# Patient Record
Sex: MALE | Race: BLACK OR AFRICAN AMERICAN | NOT HISPANIC OR LATINO | URBAN - METROPOLITAN AREA
[De-identification: names, ages, dates, MRNs, and addresses within clinical notes are randomized per-mention and may not be internally consistent; named-entity substitution may affect disease eponyms.]

---

## 2022-01-17 ENCOUNTER — EMERGENCY (EMERGENCY)
Facility: HOSPITAL | Age: 32
LOS: 1 days | Discharge: HOME | End: 2022-01-17
Attending: EMERGENCY MEDICINE
Payer: MEDICAID

## 2022-01-17 VITALS
SYSTOLIC BLOOD PRESSURE: 127 MMHG | OXYGEN SATURATION: 99 % | RESPIRATION RATE: 19 BRPM | HEIGHT: 75 IN | DIASTOLIC BLOOD PRESSURE: 71 MMHG | TEMPERATURE: 98 F | WEIGHT: 315 LBS | HEART RATE: 80 BPM

## 2022-01-17 LAB
ALBUMIN SERPL ELPH-MCNC: 4.2 G/DL — SIGNIFICANT CHANGE UP (ref 3.5–5)
ALP SERPL-CCNC: 34 U/L — LOW (ref 40–120)
ALT FLD-CCNC: 64 U/L DA — HIGH (ref 10–60)
ANION GAP SERPL CALC-SCNC: 5 MMOL/L — SIGNIFICANT CHANGE UP (ref 5–17)
APPEARANCE UR: CLEAR — SIGNIFICANT CHANGE UP
AST SERPL-CCNC: 43 U/L — HIGH (ref 10–40)
BASOPHILS # BLD AUTO: 0.02 K/UL — SIGNIFICANT CHANGE UP (ref 0–0.2)
BASOPHILS NFR BLD AUTO: 0.2 % — SIGNIFICANT CHANGE UP (ref 0–2)
BILIRUB SERPL-MCNC: 0.4 MG/DL — SIGNIFICANT CHANGE UP (ref 0.2–1.2)
BILIRUB UR-MCNC: NEGATIVE — SIGNIFICANT CHANGE UP
BUN SERPL-MCNC: 13 MG/DL — SIGNIFICANT CHANGE UP (ref 7–18)
CALCIUM SERPL-MCNC: 8.7 MG/DL — SIGNIFICANT CHANGE UP (ref 8.4–10.5)
CHLORIDE SERPL-SCNC: 105 MMOL/L — SIGNIFICANT CHANGE UP (ref 96–108)
CO2 SERPL-SCNC: 28 MMOL/L — SIGNIFICANT CHANGE UP (ref 22–31)
COLOR SPEC: YELLOW — SIGNIFICANT CHANGE UP
CREAT SERPL-MCNC: 1.62 MG/DL — HIGH (ref 0.5–1.3)
DIFF PNL FLD: ABNORMAL
EOSINOPHIL # BLD AUTO: 0 K/UL — SIGNIFICANT CHANGE UP (ref 0–0.5)
EOSINOPHIL NFR BLD AUTO: 0 % — SIGNIFICANT CHANGE UP (ref 0–6)
GLUCOSE SERPL-MCNC: 133 MG/DL — HIGH (ref 70–99)
GLUCOSE UR QL: NEGATIVE — SIGNIFICANT CHANGE UP
HCT VFR BLD CALC: 43 % — SIGNIFICANT CHANGE UP (ref 39–50)
HGB BLD-MCNC: 14.3 G/DL — SIGNIFICANT CHANGE UP (ref 13–17)
IMM GRANULOCYTES NFR BLD AUTO: 0.3 % — SIGNIFICANT CHANGE UP (ref 0–1.5)
KETONES UR-MCNC: ABNORMAL
LEUKOCYTE ESTERASE UR-ACNC: NEGATIVE — SIGNIFICANT CHANGE UP
LIDOCAIN IGE QN: 92 U/L — SIGNIFICANT CHANGE UP (ref 73–393)
LYMPHOCYTES # BLD AUTO: 1.59 K/UL — SIGNIFICANT CHANGE UP (ref 1–3.3)
LYMPHOCYTES # BLD AUTO: 14 % — SIGNIFICANT CHANGE UP (ref 13–44)
MCHC RBC-ENTMCNC: 28.7 PG — SIGNIFICANT CHANGE UP (ref 27–34)
MCHC RBC-ENTMCNC: 33.3 GM/DL — SIGNIFICANT CHANGE UP (ref 32–36)
MCV RBC AUTO: 86.3 FL — SIGNIFICANT CHANGE UP (ref 80–100)
MONOCYTES # BLD AUTO: 0.65 K/UL — SIGNIFICANT CHANGE UP (ref 0–0.9)
MONOCYTES NFR BLD AUTO: 5.7 % — SIGNIFICANT CHANGE UP (ref 2–14)
NEUTROPHILS # BLD AUTO: 9.05 K/UL — HIGH (ref 1.8–7.4)
NEUTROPHILS NFR BLD AUTO: 79.8 % — HIGH (ref 43–77)
NITRITE UR-MCNC: NEGATIVE — SIGNIFICANT CHANGE UP
NRBC # BLD: 0 /100 WBCS — SIGNIFICANT CHANGE UP (ref 0–0)
PH UR: 6 — SIGNIFICANT CHANGE UP (ref 5–8)
PLATELET # BLD AUTO: 334 K/UL — SIGNIFICANT CHANGE UP (ref 150–400)
POTASSIUM SERPL-MCNC: 3.8 MMOL/L — SIGNIFICANT CHANGE UP (ref 3.5–5.3)
POTASSIUM SERPL-SCNC: 3.8 MMOL/L — SIGNIFICANT CHANGE UP (ref 3.5–5.3)
PROT SERPL-MCNC: 8 G/DL — SIGNIFICANT CHANGE UP (ref 6–8.3)
PROT UR-MCNC: 30 MG/DL
RBC # BLD: 4.98 M/UL — SIGNIFICANT CHANGE UP (ref 4.2–5.8)
RBC # FLD: 12.6 % — SIGNIFICANT CHANGE UP (ref 10.3–14.5)
SARS-COV-2 RNA SPEC QL NAA+PROBE: DETECTED
SODIUM SERPL-SCNC: 138 MMOL/L — SIGNIFICANT CHANGE UP (ref 135–145)
SP GR SPEC: 1.02 — SIGNIFICANT CHANGE UP (ref 1.01–1.02)
UROBILINOGEN FLD QL: 4
WBC # BLD: 11.34 K/UL — HIGH (ref 3.8–10.5)
WBC # FLD AUTO: 11.34 K/UL — HIGH (ref 3.8–10.5)

## 2022-01-17 PROCEDURE — 36415 COLL VENOUS BLD VENIPUNCTURE: CPT

## 2022-01-17 PROCEDURE — 96376 TX/PRO/DX INJ SAME DRUG ADON: CPT

## 2022-01-17 PROCEDURE — 83690 ASSAY OF LIPASE: CPT

## 2022-01-17 PROCEDURE — 87635 SARS-COV-2 COVID-19 AMP PRB: CPT

## 2022-01-17 PROCEDURE — 96374 THER/PROPH/DIAG INJ IV PUSH: CPT | Mod: XU

## 2022-01-17 PROCEDURE — 96375 TX/PRO/DX INJ NEW DRUG ADDON: CPT

## 2022-01-17 PROCEDURE — 99053 MED SERV 10PM-8AM 24 HR FAC: CPT

## 2022-01-17 PROCEDURE — 99285 EMERGENCY DEPT VISIT HI MDM: CPT | Mod: 25

## 2022-01-17 PROCEDURE — 76705 ECHO EXAM OF ABDOMEN: CPT | Mod: 26,RT

## 2022-01-17 PROCEDURE — 99285 EMERGENCY DEPT VISIT HI MDM: CPT

## 2022-01-17 PROCEDURE — 85025 COMPLETE CBC W/AUTO DIFF WBC: CPT

## 2022-01-17 PROCEDURE — 76705 ECHO EXAM OF ABDOMEN: CPT

## 2022-01-17 PROCEDURE — 74177 CT ABD & PELVIS W/CONTRAST: CPT | Mod: MA

## 2022-01-17 PROCEDURE — 74177 CT ABD & PELVIS W/CONTRAST: CPT | Mod: 26,MA

## 2022-01-17 PROCEDURE — 81001 URINALYSIS AUTO W/SCOPE: CPT

## 2022-01-17 PROCEDURE — 80053 COMPREHEN METABOLIC PANEL: CPT

## 2022-01-17 PROCEDURE — 87086 URINE CULTURE/COLONY COUNT: CPT

## 2022-01-17 RX ORDER — SODIUM CHLORIDE 9 MG/ML
1000 INJECTION INTRAMUSCULAR; INTRAVENOUS; SUBCUTANEOUS ONCE
Refills: 0 | Status: COMPLETED | OUTPATIENT
Start: 2022-01-17 | End: 2022-01-17

## 2022-01-17 RX ORDER — KETOROLAC TROMETHAMINE 30 MG/ML
30 SYRINGE (ML) INJECTION ONCE
Refills: 0 | Status: DISCONTINUED | OUTPATIENT
Start: 2022-01-17 | End: 2022-01-17

## 2022-01-17 RX ORDER — MORPHINE SULFATE 50 MG/1
4 CAPSULE, EXTENDED RELEASE ORAL ONCE
Refills: 0 | Status: DISCONTINUED | OUTPATIENT
Start: 2022-01-17 | End: 2022-01-17

## 2022-01-17 RX ORDER — ONDANSETRON 8 MG/1
4 TABLET, FILM COATED ORAL ONCE
Refills: 0 | Status: COMPLETED | OUTPATIENT
Start: 2022-01-17 | End: 2022-01-17

## 2022-01-17 RX ADMIN — SODIUM CHLORIDE 1000 MILLILITER(S): 9 INJECTION INTRAMUSCULAR; INTRAVENOUS; SUBCUTANEOUS at 04:13

## 2022-01-17 RX ADMIN — MORPHINE SULFATE 4 MILLIGRAM(S): 50 CAPSULE, EXTENDED RELEASE ORAL at 04:13

## 2022-01-17 RX ADMIN — MORPHINE SULFATE 4 MILLIGRAM(S): 50 CAPSULE, EXTENDED RELEASE ORAL at 07:02

## 2022-01-17 RX ADMIN — Medication 30 MILLIGRAM(S): at 10:37

## 2022-01-17 RX ADMIN — MORPHINE SULFATE 4 MILLIGRAM(S): 50 CAPSULE, EXTENDED RELEASE ORAL at 07:04

## 2022-01-17 RX ADMIN — ONDANSETRON 4 MILLIGRAM(S): 8 TABLET, FILM COATED ORAL at 04:10

## 2022-01-17 NOTE — ED PROVIDER NOTE - PROGRESS NOTE DETAILS
(Moeller) - s/o to fu US which shows gallbladder sludge   Will fu with surgery clinic  Discussed indications for patient return to ED. Patient understood.

## 2022-01-17 NOTE — ED PROVIDER NOTE - OBJECTIVE STATEMENT
32yo M with no PMHx presents with right sided abdominal pain. Reports onset of pain at 8pm yesterday. Denies fever, vomiting, diarrhea. Denies cough or recent illness. Reports he last ate at 5pm yesterday.

## 2022-01-17 NOTE — ED PROVIDER NOTE - PATIENT PORTAL LINK FT
You can access the FollowMyHealth Patient Portal offered by [facility] by registering at the following website: [email or web address]. By joining [employer]’s FollowMyHealth portal, you will also be able to view your health information using other applications (apps) compatible with our system.

## 2022-01-17 NOTE — ED PROVIDER NOTE - CLINICAL SUMMARY MEDICAL DECISION MAKING FREE TEXT BOX
32yo M with no PMHx presents with right sided abdominal pain. labs shows AST/ALT mildly elevated, Cr 1.6-given IVF, covid positive  CT A/P shows No pericholecystic inflammation. Question mild gallbladder wall thickening.Correlation with ultrasound is recommended.Mild left intrahepatic ductal dilatation. Normal caliber CBD. Findings are of uncertain etiology. Nonurgent follow-up MRI/MRCP is recommended for further evaluation.  Discussed above with patient. On reeval patient reports persistent RUQ pain. patient agrees with plan for RUQ US.  Patient signedout to Dr. Moeller at 730am.

## 2022-01-18 ENCOUNTER — INPATIENT (INPATIENT)
Facility: HOSPITAL | Age: 32
LOS: 5 days | Discharge: HOME | DRG: 417 | End: 2022-01-24
Attending: STUDENT IN AN ORGANIZED HEALTH CARE EDUCATION/TRAINING PROGRAM | Admitting: STUDENT IN AN ORGANIZED HEALTH CARE EDUCATION/TRAINING PROGRAM
Payer: MEDICAID

## 2022-01-18 VITALS
DIASTOLIC BLOOD PRESSURE: 88 MMHG | SYSTOLIC BLOOD PRESSURE: 131 MMHG | OXYGEN SATURATION: 97 % | RESPIRATION RATE: 19 BRPM | HEART RATE: 88 BPM | TEMPERATURE: 99 F

## 2022-01-18 VITALS
RESPIRATION RATE: 16 BRPM | TEMPERATURE: 99 F | HEART RATE: 83 BPM | HEIGHT: 75 IN | WEIGHT: 315 LBS | SYSTOLIC BLOOD PRESSURE: 150 MMHG | DIASTOLIC BLOOD PRESSURE: 82 MMHG | OXYGEN SATURATION: 97 %

## 2022-01-18 DIAGNOSIS — N17.9 ACUTE KIDNEY FAILURE, UNSPECIFIED: ICD-10-CM

## 2022-01-18 DIAGNOSIS — R74.01 ELEVATION OF LEVELS OF LIVER TRANSAMINASE LEVELS: ICD-10-CM

## 2022-01-18 DIAGNOSIS — Z29.9 ENCOUNTER FOR PROPHYLACTIC MEASURES, UNSPECIFIED: ICD-10-CM

## 2022-01-18 DIAGNOSIS — R10.9 UNSPECIFIED ABDOMINAL PAIN: ICD-10-CM

## 2022-01-18 DIAGNOSIS — K80.50 CALCULUS OF BILE DUCT WITHOUT CHOLANGITIS OR CHOLECYSTITIS WITHOUT OBSTRUCTION: ICD-10-CM

## 2022-01-18 DIAGNOSIS — U07.1 COVID-19: ICD-10-CM

## 2022-01-18 LAB
ALBUMIN SERPL ELPH-MCNC: 3.6 G/DL — SIGNIFICANT CHANGE UP (ref 3.5–5)
ALP SERPL-CCNC: 49 U/L — SIGNIFICANT CHANGE UP (ref 40–120)
ALT FLD-CCNC: 121 U/L DA — HIGH (ref 10–60)
ANION GAP SERPL CALC-SCNC: 7 MMOL/L — SIGNIFICANT CHANGE UP (ref 5–17)
APTT BLD: 31.9 SEC — SIGNIFICANT CHANGE UP (ref 27.5–35.5)
AST SERPL-CCNC: 74 U/L — HIGH (ref 10–40)
BASOPHILS # BLD AUTO: 0.02 K/UL — SIGNIFICANT CHANGE UP (ref 0–0.2)
BASOPHILS NFR BLD AUTO: 0.2 % — SIGNIFICANT CHANGE UP (ref 0–2)
BILIRUB SERPL-MCNC: 1.1 MG/DL — SIGNIFICANT CHANGE UP (ref 0.2–1.2)
BUN SERPL-MCNC: 6 MG/DL — LOW (ref 7–18)
CALCIUM SERPL-MCNC: 8.6 MG/DL — SIGNIFICANT CHANGE UP (ref 8.4–10.5)
CHLORIDE SERPL-SCNC: 105 MMOL/L — SIGNIFICANT CHANGE UP (ref 96–108)
CO2 SERPL-SCNC: 27 MMOL/L — SIGNIFICANT CHANGE UP (ref 22–31)
CREAT SERPL-MCNC: 1.36 MG/DL — HIGH (ref 0.5–1.3)
CRP SERPL-MCNC: 73 MG/L — HIGH
CULTURE RESULTS: SIGNIFICANT CHANGE UP
D DIMER BLD IA.RAPID-MCNC: 192 NG/ML DDU — SIGNIFICANT CHANGE UP
D DIMER BLD IA.RAPID-MCNC: 246 NG/ML DDU — HIGH
EOSINOPHIL # BLD AUTO: 0 K/UL — SIGNIFICANT CHANGE UP (ref 0–0.5)
EOSINOPHIL NFR BLD AUTO: 0 % — SIGNIFICANT CHANGE UP (ref 0–6)
FERRITIN SERPL-MCNC: 240 NG/ML — SIGNIFICANT CHANGE UP (ref 30–400)
GLUCOSE SERPL-MCNC: 114 MG/DL — HIGH (ref 70–99)
HCT VFR BLD CALC: 43.4 % — SIGNIFICANT CHANGE UP (ref 39–50)
HGB BLD-MCNC: 14 G/DL — SIGNIFICANT CHANGE UP (ref 13–17)
IMM GRANULOCYTES NFR BLD AUTO: 0.2 % — SIGNIFICANT CHANGE UP (ref 0–1.5)
INR BLD: 1.12 RATIO — SIGNIFICANT CHANGE UP (ref 0.88–1.16)
LACTATE SERPL-SCNC: 1.2 MMOL/L — SIGNIFICANT CHANGE UP (ref 0.7–2)
LDH SERPL L TO P-CCNC: 232 U/L — HIGH (ref 120–225)
LIDOCAIN IGE QN: 311 U/L — SIGNIFICANT CHANGE UP (ref 73–393)
LYMPHOCYTES # BLD AUTO: 1.33 K/UL — SIGNIFICANT CHANGE UP (ref 1–3.3)
LYMPHOCYTES # BLD AUTO: 14.1 % — SIGNIFICANT CHANGE UP (ref 13–44)
MCHC RBC-ENTMCNC: 27.9 PG — SIGNIFICANT CHANGE UP (ref 27–34)
MCHC RBC-ENTMCNC: 32.3 GM/DL — SIGNIFICANT CHANGE UP (ref 32–36)
MCV RBC AUTO: 86.5 FL — SIGNIFICANT CHANGE UP (ref 80–100)
MONOCYTES # BLD AUTO: 1.02 K/UL — HIGH (ref 0–0.9)
MONOCYTES NFR BLD AUTO: 10.8 % — SIGNIFICANT CHANGE UP (ref 2–14)
NEUTROPHILS # BLD AUTO: 7.03 K/UL — SIGNIFICANT CHANGE UP (ref 1.8–7.4)
NEUTROPHILS NFR BLD AUTO: 74.7 % — SIGNIFICANT CHANGE UP (ref 43–77)
NRBC # BLD: 0 /100 WBCS — SIGNIFICANT CHANGE UP (ref 0–0)
NT-PROBNP SERPL-SCNC: 33 PG/ML — SIGNIFICANT CHANGE UP (ref 0–125)
PLATELET # BLD AUTO: 344 K/UL — SIGNIFICANT CHANGE UP (ref 150–400)
POTASSIUM SERPL-MCNC: 3.7 MMOL/L — SIGNIFICANT CHANGE UP (ref 3.5–5.3)
POTASSIUM SERPL-SCNC: 3.7 MMOL/L — SIGNIFICANT CHANGE UP (ref 3.5–5.3)
PROCALCITONIN SERPL-MCNC: 0.11 NG/ML — HIGH (ref 0.02–0.1)
PROT SERPL-MCNC: 7.9 G/DL — SIGNIFICANT CHANGE UP (ref 6–8.3)
PROTHROM AB SERPL-ACNC: 13.3 SEC — SIGNIFICANT CHANGE UP (ref 10.6–13.6)
RBC # BLD: 5.02 M/UL — SIGNIFICANT CHANGE UP (ref 4.2–5.8)
RBC # FLD: 13 % — SIGNIFICANT CHANGE UP (ref 10.3–14.5)
SODIUM SERPL-SCNC: 139 MMOL/L — SIGNIFICANT CHANGE UP (ref 135–145)
SPECIMEN SOURCE: SIGNIFICANT CHANGE UP
TROPONIN I, HIGH SENSITIVITY RESULT: 5.8 NG/L — SIGNIFICANT CHANGE UP
WBC # BLD: 9.42 K/UL — SIGNIFICANT CHANGE UP (ref 3.8–10.5)
WBC # FLD AUTO: 9.42 K/UL — SIGNIFICANT CHANGE UP (ref 3.8–10.5)

## 2022-01-18 PROCEDURE — 99285 EMERGENCY DEPT VISIT HI MDM: CPT

## 2022-01-18 PROCEDURE — 99253 IP/OBS CNSLTJ NEW/EST LOW 45: CPT

## 2022-01-18 PROCEDURE — 99053 MED SERV 10PM-8AM 24 HR FAC: CPT

## 2022-01-18 PROCEDURE — 99223 1ST HOSP IP/OBS HIGH 75: CPT

## 2022-01-18 PROCEDURE — 93010 ELECTROCARDIOGRAM REPORT: CPT

## 2022-01-18 PROCEDURE — 71046 X-RAY EXAM CHEST 2 VIEWS: CPT | Mod: 26

## 2022-01-18 RX ORDER — SUCRALFATE 1 G
1 TABLET ORAL ONCE
Refills: 0 | Status: COMPLETED | OUTPATIENT
Start: 2022-01-18 | End: 2022-01-18

## 2022-01-18 RX ORDER — ACETAMINOPHEN 500 MG
650 TABLET ORAL EVERY 6 HOURS
Refills: 0 | Status: DISCONTINUED | OUTPATIENT
Start: 2022-01-18 | End: 2022-01-20

## 2022-01-18 RX ORDER — HYDROMORPHONE HYDROCHLORIDE 2 MG/ML
0.5 INJECTION INTRAMUSCULAR; INTRAVENOUS; SUBCUTANEOUS ONCE
Refills: 0 | Status: DISCONTINUED | OUTPATIENT
Start: 2022-01-18 | End: 2022-01-18

## 2022-01-18 RX ORDER — ENOXAPARIN SODIUM 100 MG/ML
30 INJECTION SUBCUTANEOUS DAILY
Refills: 0 | Status: DISCONTINUED | OUTPATIENT
Start: 2022-01-18 | End: 2022-01-18

## 2022-01-18 RX ORDER — SODIUM CHLORIDE 9 MG/ML
1000 INJECTION INTRAMUSCULAR; INTRAVENOUS; SUBCUTANEOUS ONCE
Refills: 0 | Status: COMPLETED | OUTPATIENT
Start: 2022-01-18 | End: 2022-01-18

## 2022-01-18 RX ORDER — MORPHINE SULFATE 50 MG/1
4 CAPSULE, EXTENDED RELEASE ORAL ONCE
Refills: 0 | Status: DISCONTINUED | OUTPATIENT
Start: 2022-01-18 | End: 2022-01-18

## 2022-01-18 RX ORDER — ENOXAPARIN SODIUM 100 MG/ML
40 INJECTION SUBCUTANEOUS DAILY
Refills: 0 | Status: DISCONTINUED | OUTPATIENT
Start: 2022-01-18 | End: 2022-01-21

## 2022-01-18 RX ORDER — HYDROMORPHONE HYDROCHLORIDE 2 MG/ML
0.5 INJECTION INTRAMUSCULAR; INTRAVENOUS; SUBCUTANEOUS EVERY 6 HOURS
Refills: 0 | Status: DISCONTINUED | OUTPATIENT
Start: 2022-01-18 | End: 2022-01-19

## 2022-01-18 RX ORDER — KETOROLAC TROMETHAMINE 30 MG/ML
30 SYRINGE (ML) INJECTION ONCE
Refills: 0 | Status: DISCONTINUED | OUTPATIENT
Start: 2022-01-18 | End: 2022-01-18

## 2022-01-18 RX ORDER — PANTOPRAZOLE SODIUM 20 MG/1
40 TABLET, DELAYED RELEASE ORAL
Refills: 0 | Status: DISCONTINUED | OUTPATIENT
Start: 2022-01-18 | End: 2022-01-24

## 2022-01-18 RX ORDER — ENOXAPARIN SODIUM 100 MG/ML
40 INJECTION SUBCUTANEOUS DAILY
Refills: 0 | Status: DISCONTINUED | OUTPATIENT
Start: 2022-01-18 | End: 2022-01-18

## 2022-01-18 RX ORDER — PANTOPRAZOLE SODIUM 20 MG/1
40 TABLET, DELAYED RELEASE ORAL ONCE
Refills: 0 | Status: COMPLETED | OUTPATIENT
Start: 2022-01-18 | End: 2022-01-18

## 2022-01-18 RX ORDER — ONDANSETRON 8 MG/1
4 TABLET, FILM COATED ORAL EVERY 6 HOURS
Refills: 0 | Status: DISCONTINUED | OUTPATIENT
Start: 2022-01-18 | End: 2022-01-24

## 2022-01-18 RX ORDER — SODIUM CHLORIDE 9 MG/ML
1000 INJECTION, SOLUTION INTRAVENOUS
Refills: 0 | Status: DISCONTINUED | OUTPATIENT
Start: 2022-01-18 | End: 2022-01-21

## 2022-01-18 RX ADMIN — HYDROMORPHONE HYDROCHLORIDE 0.5 MILLIGRAM(S): 2 INJECTION INTRAMUSCULAR; INTRAVENOUS; SUBCUTANEOUS at 12:39

## 2022-01-18 RX ADMIN — SODIUM CHLORIDE 100 MILLILITER(S): 9 INJECTION, SOLUTION INTRAVENOUS at 07:54

## 2022-01-18 RX ADMIN — SODIUM CHLORIDE 1000 MILLILITER(S): 9 INJECTION INTRAMUSCULAR; INTRAVENOUS; SUBCUTANEOUS at 03:13

## 2022-01-18 RX ADMIN — MORPHINE SULFATE 4 MILLIGRAM(S): 50 CAPSULE, EXTENDED RELEASE ORAL at 04:07

## 2022-01-18 RX ADMIN — ENOXAPARIN SODIUM 40 MILLIGRAM(S): 100 INJECTION SUBCUTANEOUS at 11:02

## 2022-01-18 RX ADMIN — HYDROMORPHONE HYDROCHLORIDE 0.5 MILLIGRAM(S): 2 INJECTION INTRAMUSCULAR; INTRAVENOUS; SUBCUTANEOUS at 04:33

## 2022-01-18 RX ADMIN — HYDROMORPHONE HYDROCHLORIDE 0.5 MILLIGRAM(S): 2 INJECTION INTRAMUSCULAR; INTRAVENOUS; SUBCUTANEOUS at 17:02

## 2022-01-18 RX ADMIN — HYDROMORPHONE HYDROCHLORIDE 0.5 MILLIGRAM(S): 2 INJECTION INTRAMUSCULAR; INTRAVENOUS; SUBCUTANEOUS at 10:59

## 2022-01-18 RX ADMIN — Medication 30 MILLIGRAM(S): at 04:15

## 2022-01-18 RX ADMIN — Medication 30 MILLILITER(S): at 03:13

## 2022-01-18 RX ADMIN — Medication 30 MILLIGRAM(S): at 05:37

## 2022-01-18 RX ADMIN — MORPHINE SULFATE 4 MILLIGRAM(S): 50 CAPSULE, EXTENDED RELEASE ORAL at 03:13

## 2022-01-18 RX ADMIN — PANTOPRAZOLE SODIUM 40 MILLIGRAM(S): 20 TABLET, DELAYED RELEASE ORAL at 03:14

## 2022-01-18 RX ADMIN — HYDROMORPHONE HYDROCHLORIDE 0.5 MILLIGRAM(S): 2 INJECTION INTRAMUSCULAR; INTRAVENOUS; SUBCUTANEOUS at 05:37

## 2022-01-18 RX ADMIN — SODIUM CHLORIDE 1000 MILLILITER(S): 9 INJECTION INTRAMUSCULAR; INTRAVENOUS; SUBCUTANEOUS at 04:15

## 2022-01-18 RX ADMIN — HYDROMORPHONE HYDROCHLORIDE 0.5 MILLIGRAM(S): 2 INJECTION INTRAMUSCULAR; INTRAVENOUS; SUBCUTANEOUS at 18:01

## 2022-01-18 RX ADMIN — Medication 1 GRAM(S): at 04:32

## 2022-01-18 RX ADMIN — PANTOPRAZOLE SODIUM 40 MILLIGRAM(S): 20 TABLET, DELAYED RELEASE ORAL at 07:50

## 2022-01-18 NOTE — H&P ADULT - PROBLEM SELECTOR PLAN 2
- Likely Pre renal for dehydration   - Will send U lytes   - IV fluids 0.9% NS; Cr improves on fluids - Likely Pre renal for dehydration   - Will send U lytes   - IV fluids LR; Cr improves on fluids

## 2022-01-18 NOTE — H&P ADULT - PROBLEM SELECTOR PLAN 1
- RUQ pain started 2 days ago   - US liver showed biliary sludge   - Will consult Dr. Angeles   - Keep him NPO   - Tylenol and Dilaudid for Pain PRN   - IV fluids 0.9% NS 100cc/hr   - f/u GI rec's - RUQ pain started 2 days ago   - US liver showed biliary sludge   - Will consult Dr. Angeles   - Keep him NPO   - Tylenol and Dilaudid for Pain PRN   - IV fluids LR  - f/u GI rec's

## 2022-01-18 NOTE — ED ADULT NURSE NOTE - OBJECTIVE STATEMENT
Pt presents to the ED with c/o worsening abdominal pain. Pt was seen yesterday for the same complaint. Denies fever, nausea, vomiting, or diarrhea.

## 2022-01-18 NOTE — H&P ADULT - NSHPSOCIALHISTORY_GEN_ALL_CORE
Self-employed in hair-extension business  Denies use of tobacco or illicit drugs  Drinks alcohol several days in the week, but denies excessive use

## 2022-01-18 NOTE — H&P ADULT - ATTENDING COMMENTS
Discussed with MAR    This is a 30 y/o male with no significant medical history presenting with RUQ pain x 2 days. Patient Reports That since January 1st, he has been on a strict juice-only diet as he is trying to pursue a healthier lifestyle. He says he drinks up to 6 juices per day from [employer] and has lost over 10 lbs. He reports that 2 days ago, his mother made him chicken and broccoli, which was the first solid food he has eaten since beginning this diet. Since then, he has had intractable RUQ pain. Reports pain seems to be moving to the left abdomen as well now. He reports vomiting on the day symptoms began, but not since then. Denies fever, chills or any other complaints. US abdomen shows biliary sludge. Patient's biliary colic likely 2/2 to reintroduction of solid food after extended period of liquid-only diet. Will admit for pain control and GI eval.    Vital Signs Last 24 Hrs  T(C): 37.2 (18 Jan 2022 02:22), Max: 37.2 (18 Jan 2022 02:22)  T(F): 98.9 (18 Jan 2022 02:22), Max: 98.9 (18 Jan 2022 02:22)  HR: 83 (18 Jan 2022 02:22) (62 - 91)  BP: 150/82 (18 Jan 2022 02:22) (145/79 - 150/82)  BP(mean): --  RR: 16 (18 Jan 2022 02:22) (16 - 18)  SpO2: 97% (18 Jan 2022 02:22) (97% - 97%)    PEx:  as above    A/P:  1. Biliary colic  - likely related to biliary sludge seen on US after reintroduction of solid food into diet  - pain control prn  - NPO for now  - IV fluids  - GI eval    2. THEA  - pt denies knowledge of kidney disease  - check urine lytes  - IV fluid challenge  - kidneys appeared normal on recent abdominal CT  - avoid nephrotoxins    3. COVID-19  - incidentally found to have COVID  - patient is unvaccinated  - asymptomatic - supportive care    4. Morbid obesity  - BMI >40  - lifestyle modification counseling provided    5. PPX  - lovenox  - PPI Discussed with MAR    This is a 30 y/o male with no significant medical history presenting with RUQ pain x 2 days. Patient Reports That since January 1st, he has been on a strict juice-only diet as he is trying to pursue a healthier lifestyle. He says he drinks up to 6 juices per day from [employer] and has lost over 10 lbs. He reports that 2 days ago, his mother made him chicken and broccoli, which was the first solid food he has eaten since beginning this diet. Since then, he has had intractable RUQ pain. Reports pain seems to be moving to the left abdomen as well now. He reports vomiting on the day symptoms began, but not since then. Denies fever, chills or any other complaints. US abdomen shows biliary sludge. Patient's biliary colic likely 2/2 to reintroduction of solid food after extended period of liquid-only diet. Will admit for pain control and GI eval.    Vital Signs Last 24 Hrs  T(C): 37.2 (18 Jan 2022 02:22), Max: 37.2 (18 Jan 2022 02:22)  T(F): 98.9 (18 Jan 2022 02:22), Max: 98.9 (18 Jan 2022 02:22)  HR: 83 (18 Jan 2022 02:22) (62 - 91)  BP: 150/82 (18 Jan 2022 02:22) (145/79 - 150/82)  BP(mean): --  RR: 16 (18 Jan 2022 02:22) (16 - 18)  SpO2: 97% (18 Jan 2022 02:22) (97% - 97%)    PEx:  as above    A/P:  1. Biliary colic  - likely related to biliary sludge seen on US after reintroduction of solid food into diet  - pain control prn  - NPO for now  - IV fluids  - GI eval    2. THEA  - pt denies knowledge of kidney disease  - check urine lytes  - IV fluid challenge  - kidneys appeared normal on recent abdominal CT  - avoid nephrotoxins    3. Transaminitis  - as above  - check hepatitis panel  - trend cmp    4. COVID-19  - incidentally found to have COVID  - patient is unvaccinated  - asymptomatic - supportive care    5. Morbid obesity  - BMI >40  - lifestyle modification counseling provided    6. PPX  - lovenox  - PPI Discussed with MAR PGY2 Dr. Ruddy Ramos.    This is a 32 y/o male with no significant medical history presenting with RUQ pain x 2 days. Patient Reports That since January 1st, he has been on a strict juice-only diet as he is trying to pursue a healthier lifestyle. He says he drinks up to 6 juices per day from [employer] and has lost over 10 lbs. He reports that 2 days ago, his mother made him chicken and broccoli, which was the first solid food he has eaten since beginning this diet. Since then, he has had intractable RUQ pain. Reports pain seems to be moving to the left abdomen as well now. He reports vomiting on the day symptoms began, but not since then. Denies fever, chills or any other complaints. US abdomen shows biliary sludge. Patient's biliary colic likely 2/2 to reintroduction of solid food after extended period of liquid-only diet. Will admit for pain control and GI eval.    Vital Signs Last 24 Hrs  T(C): 37.2 (18 Jan 2022 02:22), Max: 37.2 (18 Jan 2022 02:22)  T(F): 98.9 (18 Jan 2022 02:22), Max: 98.9 (18 Jan 2022 02:22)  HR: 83 (18 Jan 2022 02:22) (62 - 91)  BP: 150/82 (18 Jan 2022 02:22) (145/79 - 150/82)  BP(mean): --  RR: 16 (18 Jan 2022 02:22) (16 - 18)  SpO2: 97% (18 Jan 2022 02:22) (97% - 97%)    PEx:  as above    A/P:  1. Biliary colic  - likely related to biliary sludge seen on US after reintroduction of solid food into diet  - pain control prn  - NPO for now  - IV fluids  - GI eval    2. THEA  - pt denies knowledge of kidney disease  - check urine lytes  - IV fluid challenge  - kidneys appeared normal on recent abdominal CT  - avoid nephrotoxins    3. Transaminitis  - as above  - check hepatitis panel  - trend cmp    4. COVID-19  - incidentally found to have COVID  - patient is unvaccinated  - asymptomatic - supportive care    5. Morbid obesity  - BMI >40  - lifestyle modification counseling provided    6. PPX  - lovenox  - PPI

## 2022-01-18 NOTE — H&P ADULT - HISTORY OF PRESENT ILLNESS
Patient is 31 years old male with no past medical history presented to ED due to right upper quadrant pain started 2 days ago. Patient stated that since Jan 1st he started to follow strict juice diet but for last couple days he started to have solid food. His pain is RUQ, 5/10, moves sometimes to left side, non radiating anywhere. Patient also was tested positive for COVID but Asymptomatic. He is Unvaccinated. Patient doesn't take any medications at home. He denied chest pain, N/V or change in bowel movement.

## 2022-01-18 NOTE — H&P ADULT - ASSESSMENT
Patient is 31 years old male with no past medical history presented to ED due to right upper quadrant pain started 2 days ago. Patient stated that since Jan 1st he started to follow strict juice diet but for last couple days he started to have solid food. US liver showed biliary sludge but no stones. Patient will be admitted for further care needs GI consult.

## 2022-01-18 NOTE — ED ADULT TRIAGE NOTE - CHIEF COMPLAINT QUOTE
Woke up with RUQ pain radiating to the center  was seen here yesterday same problem COVID + yesterdar denies nausea no vomiting

## 2022-01-18 NOTE — ED PROVIDER NOTE - CLINICAL SUMMARY MEDICAL DECISION MAKING FREE TEXT BOX
31 y.o male presents with worsening RUQ pain that is now radiating to the LUQ. In the setting of covid infection, will obtain CXR to rule out PNA and D-dimer. Give IV fluids, Morphine, GI cocktail, and reassess. 31 y.o male presents with worsening RUQ pain that is now radiating to the LUQ. In the setting of covid infection, will obtain CXR to rule out PNA and D-dimer. Give IV fluids, Morphine, GI cocktail, and reassess.    ekg nonischemic, trop wnl, ddimer wnl, CXR shows no focal infiltrates, AST/ALT mildly increased from yesterday  discussed above with patient, after meds patient reports no improvement of pain, given sucralfate and dilaudid with improvement of pain. At this time patient agrees with plan for admission for GI evaluation.

## 2022-01-18 NOTE — H&P ADULT - NSHPREVIEWOFSYSTEMS_GEN_ALL_CORE
REVIEW OF SYSTEMS:  CONSTITUTIONAL: No weakness, fevers or chills  EYES/ENT: No visual changes;  No vertigo or throat pain   RESPIRATORY: No cough, wheezing, hemoptysis; No shortness of breath  CARDIOVASCULAR: No chest pain or palpitations  GASTROINTESTINAL: Upper quadrant abdominal pain   GENITOURINARY: No dysuria, frequency or hematuria  NEUROLOGICAL: No numbness or weakness  SKIN: No itching, rashes

## 2022-01-18 NOTE — ED PROVIDER NOTE - OBJECTIVE STATEMENT
31 y.o male with no PMHx presents with worsening abdominal pain. Patient was seen in the [facility] ED yesterday for RUQ pain; CT and ultrasound was unremarkable. At home the pain got worse and the pain is now moving from the right side to the LUQ. Patient denies of fever, vomiting, cough, or diarrhea today. In the ED, the Morphine made the pain better, but once he went home the pain worsened.

## 2022-01-19 DIAGNOSIS — Z02.9 ENCOUNTER FOR ADMINISTRATIVE EXAMINATIONS, UNSPECIFIED: ICD-10-CM

## 2022-01-19 LAB
ABO RH CONFIRMATION: SIGNIFICANT CHANGE UP
ALBUMIN SERPL ELPH-MCNC: 3.4 G/DL — LOW (ref 3.5–5)
ALP SERPL-CCNC: 80 U/L — SIGNIFICANT CHANGE UP (ref 40–120)
ALT FLD-CCNC: 201 U/L DA — HIGH (ref 10–60)
ANION GAP SERPL CALC-SCNC: 6 MMOL/L — SIGNIFICANT CHANGE UP (ref 5–17)
AST SERPL-CCNC: 138 U/L — HIGH (ref 10–40)
BASOPHILS # BLD AUTO: 0.02 K/UL — SIGNIFICANT CHANGE UP (ref 0–0.2)
BASOPHILS NFR BLD AUTO: 0.2 % — SIGNIFICANT CHANGE UP (ref 0–2)
BILIRUB SERPL-MCNC: 0.9 MG/DL — SIGNIFICANT CHANGE UP (ref 0.2–1.2)
BLD GP AB SCN SERPL QL: SIGNIFICANT CHANGE UP
BUN SERPL-MCNC: 6 MG/DL — LOW (ref 7–18)
CALCIUM SERPL-MCNC: 9.4 MG/DL — SIGNIFICANT CHANGE UP (ref 8.4–10.5)
CHLORIDE SERPL-SCNC: 105 MMOL/L — SIGNIFICANT CHANGE UP (ref 96–108)
CO2 SERPL-SCNC: 28 MMOL/L — SIGNIFICANT CHANGE UP (ref 22–31)
CREAT SERPL-MCNC: 1.39 MG/DL — HIGH (ref 0.5–1.3)
EOSINOPHIL # BLD AUTO: 0 K/UL — SIGNIFICANT CHANGE UP (ref 0–0.5)
EOSINOPHIL NFR BLD AUTO: 0 % — SIGNIFICANT CHANGE UP (ref 0–6)
GLUCOSE SERPL-MCNC: 138 MG/DL — HIGH (ref 70–99)
HCT VFR BLD CALC: 41.2 % — SIGNIFICANT CHANGE UP (ref 39–50)
HGB BLD-MCNC: 13.4 G/DL — SIGNIFICANT CHANGE UP (ref 13–17)
IMM GRANULOCYTES NFR BLD AUTO: 0.5 % — SIGNIFICANT CHANGE UP (ref 0–1.5)
LYMPHOCYTES # BLD AUTO: 1.46 K/UL — SIGNIFICANT CHANGE UP (ref 1–3.3)
LYMPHOCYTES # BLD AUTO: 13 % — SIGNIFICANT CHANGE UP (ref 13–44)
MAGNESIUM SERPL-MCNC: 2.1 MG/DL — SIGNIFICANT CHANGE UP (ref 1.6–2.6)
MCHC RBC-ENTMCNC: 28.3 PG — SIGNIFICANT CHANGE UP (ref 27–34)
MCHC RBC-ENTMCNC: 32.5 GM/DL — SIGNIFICANT CHANGE UP (ref 32–36)
MCV RBC AUTO: 87.1 FL — SIGNIFICANT CHANGE UP (ref 80–100)
MONOCYTES # BLD AUTO: 1.26 K/UL — HIGH (ref 0–0.9)
MONOCYTES NFR BLD AUTO: 11.2 % — SIGNIFICANT CHANGE UP (ref 2–14)
NEUTROPHILS # BLD AUTO: 8.45 K/UL — HIGH (ref 1.8–7.4)
NEUTROPHILS NFR BLD AUTO: 75.1 % — SIGNIFICANT CHANGE UP (ref 43–77)
NRBC # BLD: 0 /100 WBCS — SIGNIFICANT CHANGE UP (ref 0–0)
PHOSPHATE SERPL-MCNC: 2.1 MG/DL — LOW (ref 2.5–4.5)
PLATELET # BLD AUTO: 296 K/UL — SIGNIFICANT CHANGE UP (ref 150–400)
POTASSIUM SERPL-MCNC: 3.8 MMOL/L — SIGNIFICANT CHANGE UP (ref 3.5–5.3)
POTASSIUM SERPL-SCNC: 3.8 MMOL/L — SIGNIFICANT CHANGE UP (ref 3.5–5.3)
PROT SERPL-MCNC: 7.5 G/DL — SIGNIFICANT CHANGE UP (ref 6–8.3)
RBC # BLD: 4.73 M/UL — SIGNIFICANT CHANGE UP (ref 4.2–5.8)
RBC # FLD: 13.4 % — SIGNIFICANT CHANGE UP (ref 10.3–14.5)
SODIUM SERPL-SCNC: 139 MMOL/L — SIGNIFICANT CHANGE UP (ref 135–145)
WBC # BLD: 11.25 K/UL — HIGH (ref 3.8–10.5)
WBC # FLD AUTO: 11.25 K/UL — HIGH (ref 3.8–10.5)

## 2022-01-19 PROCEDURE — 99233 SBSQ HOSP IP/OBS HIGH 50: CPT

## 2022-01-19 PROCEDURE — 99232 SBSQ HOSP IP/OBS MODERATE 35: CPT

## 2022-01-19 RX ADMIN — HYDROMORPHONE HYDROCHLORIDE 0.5 MILLIGRAM(S): 2 INJECTION INTRAMUSCULAR; INTRAVENOUS; SUBCUTANEOUS at 16:53

## 2022-01-19 RX ADMIN — SODIUM CHLORIDE 100 MILLILITER(S): 9 INJECTION, SOLUTION INTRAVENOUS at 03:40

## 2022-01-19 RX ADMIN — PANTOPRAZOLE SODIUM 40 MILLIGRAM(S): 20 TABLET, DELAYED RELEASE ORAL at 06:12

## 2022-01-19 RX ADMIN — HYDROMORPHONE HYDROCHLORIDE 0.5 MILLIGRAM(S): 2 INJECTION INTRAMUSCULAR; INTRAVENOUS; SUBCUTANEOUS at 08:31

## 2022-01-19 RX ADMIN — SODIUM CHLORIDE 100 MILLILITER(S): 9 INJECTION, SOLUTION INTRAVENOUS at 05:53

## 2022-01-19 RX ADMIN — HYDROMORPHONE HYDROCHLORIDE 0.5 MILLIGRAM(S): 2 INJECTION INTRAMUSCULAR; INTRAVENOUS; SUBCUTANEOUS at 08:01

## 2022-01-19 RX ADMIN — HYDROMORPHONE HYDROCHLORIDE 0.5 MILLIGRAM(S): 2 INJECTION INTRAMUSCULAR; INTRAVENOUS; SUBCUTANEOUS at 03:40

## 2022-01-19 RX ADMIN — HYDROMORPHONE HYDROCHLORIDE 0.5 MILLIGRAM(S): 2 INJECTION INTRAMUSCULAR; INTRAVENOUS; SUBCUTANEOUS at 17:15

## 2022-01-19 RX ADMIN — HYDROMORPHONE HYDROCHLORIDE 0.5 MILLIGRAM(S): 2 INJECTION INTRAMUSCULAR; INTRAVENOUS; SUBCUTANEOUS at 00:09

## 2022-01-19 NOTE — PATIENT PROFILE ADULT - FUNCTIONAL ASSESSMENT - BASIC MOBILITY 1.
TRANSFER - OUT REPORT:     Verbal report given to: RN. Report consisted of patient's Situation, Background, Assessment and   Recommendations(SBAR). Opportunity for questions and clarification was provided. Patient transported with a Registered Nurse. Patient transported to: recovery. 4 = No assist / stand by assistance

## 2022-01-19 NOTE — PROGRESS NOTE ADULT - SUBJECTIVE AND OBJECTIVE BOX
Patient is a 31y old  Male who presents with a chief complaint of RUQ pain (19 Jan 2022 10:37)    INTERVAL HPI/OVERNIGHT EVENTS: nO acute event onvernight    REVIEW OF SYSTEMS:  CONSTITUTIONAL: No fever, chills  ENMT:  No difficulty hearing, no change in vision  NECK: No pain or stiffness  RESPIRATORY: No cough, SOB  CARDIOVASCULAR: No chest pain, palpitations  GASTROINTESTINAL: No abdominal pain. No nausea, vomiting, or diarrhea  GENITOURINARY: No dysuria  NEUROLOGICAL: No HA  SKIN: No itching, burning, rashes, or lesions   LYMPH NODES: No enlarged glands  ENDOCRINE: No heat or cold intolerance; No hair loss  MUSCULOSKELETAL: No joint pain or swelling; No muscle, back, or extremity pain  PSYCHIATRIC: No depression, anxiety  HEME/LYMPH: No easy bruising, or bleeding gums    T(C): 36.9 (01-19-22 @ 11:20), Max: 37.3 (01-19-22 @ 00:17)  HR: 105 (01-19-22 @ 11:20) (88 - 110)  BP: 143/85 (01-19-22 @ 11:20) (122/84 - 151/69)  RR: 18 (01-19-22 @ 11:20) (18 - 20)  SpO2: 97% (01-19-22 @ 11:20) (97% - 99%)  Wt(kg): --Vital Signs Last 24 Hrs  T(C): 36.9 (19 Jan 2022 11:20), Max: 37.3 (19 Jan 2022 00:17)  T(F): 98.4 (19 Jan 2022 11:20), Max: 99.2 (19 Jan 2022 00:17)  HR: 105 (19 Jan 2022 11:20) (88 - 110)  BP: 143/85 (19 Jan 2022 11:20) (122/84 - 151/69)  BP(mean): --  RR: 18 (19 Jan 2022 11:20) (18 - 20)  SpO2: 97% (19 Jan 2022 11:20) (97% - 99%)    MEDICATIONS  (STANDING):  enoxaparin Injectable 40 milliGRAM(s) SubCutaneous daily  lactated ringers. 1000 milliLiter(s) (100 mL/Hr) IV Continuous <Continuous>  pantoprazole    Tablet 40 milliGRAM(s) Oral before breakfast    MEDICATIONS  (PRN):  acetaminophen     Tablet .. 650 milliGRAM(s) Oral every 6 hours PRN Mild Pain (1 - 3)  HYDROmorphone  Injectable 0.5 milliGRAM(s) IV Push every 6 hours PRN Severe Pain (7 - 10)  ondansetron Injectable 4 milliGRAM(s) IV Push every 6 hours PRN Nausea and/or Vomiting      PHYSICAL EXAM:  GENERAL: NAD  EYES: clear conjunctiva; EOMI  ENMT: Moist mucous membranes  NECK: Supple, No JVD, Normal thyroid  CHEST/LUNG: Clear to auscultation bilaterally; No rales, rhonchi, wheezing, or rubs  HEART: S1, S2, Regular rate and rhythm  ABDOMEN: Soft, Nontender, Nondistended; Bowel sounds present  NEURO: Alert & Oriented X3  EXTREMITIES: No LE edema, no calf tenderness  LYMPH: No lymphadenopathy noted  SKIN: No rashes or lesions    Consultant(s) Notes Reviewed:  [x ] YES  [ ] NO  Care Discussed with Consultants/Other Providers [ x] YES  [ ] NO    LABS:                        13.4   11.25 )-----------( 296      ( 19 Jan 2022 06:53 )             41.2     01-19    139  |  105  |  6<L>  ----------------------------<  138<H>  3.8   |  28  |  1.39<H>    Ca    9.4      19 Jan 2022 06:53  Phos  2.1     01-19  Mg     2.1     01-19    TPro  7.5  /  Alb  3.4<L>  /  TBili  0.9  /  DBili  x   /  AST  138<H>  /  ALT  201<H>  /  AlkPhos  80  01-19    PT/INR - ( 18 Jan 2022 03:13 )   PT: 13.3 sec;   INR: 1.12 ratio         PTT - ( 18 Jan 2022 03:13 )  PTT:31.9 sec  CAPILLARY BLOOD GLUCOSE      RADIOLOGY & ADDITIONAL TESTS:    Imaging Personally Reviewed:  [ x] YES  [ ] NO  < from: Xray Chest 2 Views PA/Lat (01.18.22 @ 02:51) >  ACC: [id number] EXAM:  XR CHEST PA LAT 2V                          PROCEDURE DATE:  01/18/2022          INTERPRETATION:  PA and lateral chest radiographs    COMPARISON: 1/18/2020 chest.    CLINICAL INFORMATION: Assess for pneumonia.    FINDINGS:    PULMONARY: The airway is midline.  There are no airspace consolidations.  No pleural effusion or pneumothorax.    HEART/VASCULAR: The heart size and mediastinum configuration are within   the limits of normal.    BONES: The visualized osseus structuresare intact.    IMPRESSION:    No acute radiographic cardiopulmonary pathology.    --- End of Report ---              < end of copied text >  < from: US Abdomen Upper Quadrant Right (01.17.22 @ 09:07) >  ACC: [id number] EXAM:  US ABDOMEN RT UPR QUADRANT                          PROCEDURE DATE:  01/17/2022          INTERPRETATION:  CLINICAL INFORMATION: Abdominal pain    COMPARISON: None available.    TECHNIQUE: Sonography of the right upper quadrant.    FINDINGS:    Liver: Within normal limits.  Bile ducts: Normal caliber. Common bile duct measures 7 mm.  Gallbladder: Gallbladder sludge without gallstones  Pancreas: Partially obscured  Right kidney: 11.0 cm. No hydronephrosis.  Ascites: None.  IVC: Visualized portions are within normal limits.    IMPRESSION:    Gallbladder sludge without gallstones.        --- End of Report ---            KHARI LOPEZ MD; Attending Radiologist  This document has been electronically signed. Jan 17 2022  9:38AM    < end of copied text >

## 2022-01-19 NOTE — PROGRESS NOTE ADULT - ASSESSMENT
30 y/o Male w/ symptomatic cholelithiasis, COVID positive     -NPO  -IVF   -IV abx   -Pain medication PRN   -Supportive care for COVID infection   -Pt wants surgical intervention, will f/u w/ Dr Hank nguyen

## 2022-01-19 NOTE — PROGRESS NOTE ADULT - ASSESSMENT
Patient is 31 years old male with no past medical history presented to ED due to right upper quadrant pain started 2 days ago. Patient stated that since Jan 1st he started to follow strict juice diet but for last couple days he started to have solid food. US liver showed biliary sludge but no stones. Plan for Cholecystectomy No GI intervention at this time. Surgery following. incidentally found to have COvid.. CXr unremarkable.

## 2022-01-19 NOTE — PATIENT PROFILE ADULT - FALL HARM RISK - UNIVERSAL INTERVENTIONS
Bed in lowest position, wheels locked, appropriate side rails in place/Call bell, personal items and telephone in reach/Instruct patient to call for assistance before getting out of bed or chair/Non-slip footwear when patient is out of bed/[place] to call system/Physically safe environment - no spills, clutter or unnecessary equipment/Purposeful Proactive Rounding/Room/bathroom lighting operational, light cord in reach

## 2022-01-19 NOTE — PROGRESS NOTE ADULT - SUBJECTIVE AND OBJECTIVE BOX
INTERVAL HPI/OVERNIGHT EVENTS:    Pt seen and examined at bedside. Pt admits to abdominal pain overnight, currently improved after receiving pain medication. Offers no other complaints.   Pt is NPO    Vital Signs Last 24 Hrs  T(C): 37.2 (19 Jan 2022 07:30), Max: 37.3 (19 Jan 2022 00:17)  T(F): 98.9 (19 Jan 2022 07:30), Max: 99.2 (19 Jan 2022 00:17)  HR: 108 (19 Jan 2022 07:30) (88 - 110)  BP: 151/69 (19 Jan 2022 07:30) (122/84 - 151/69)  BP(mean): --  RR: 18 (19 Jan 2022 07:30) (18 - 20)  SpO2: 97% (19 Jan 2022 07:30) (97% - 99%)  I&O's Detail    pantoprazole    Tablet 40 milliGRAM(s) Oral before breakfast      Physical Exam  General: AAOx3, No acute distress  Skin: No jaundice, no icterus  Abdomen: soft, nondistended, RUQ TTP, no rebound tenderness, no guarding, no palpable masses  Extremities: non edematous, no calf pain bilaterally        Labs:                        13.4   11.25 )-----------( 296      ( 19 Jan 2022 06:53 )             41.2     01-19    139  |  105  |  6<L>  ----------------------------<  138<H>  3.8   |  28  |  1.39<H>    Ca    9.4      19 Jan 2022 06:53  Phos  2.1     01-19  Mg     2.1     01-19    TPro  7.5  /  Alb  3.4<L>  /  TBili  0.9  /  DBili  x   /  AST  138<H>  /  ALT  201<H>  /  AlkPhos  80  01-19    PT/INR - ( 18 Jan 2022 03:13 )   PT: 13.3 sec;   INR: 1.12 ratio         PTT - ( 18 Jan 2022 03:13 )  PTT:31.9 sec

## 2022-01-19 NOTE — ED ADULT NURSE REASSESSMENT NOTE - NS ED NURSE REASSESS COMMENT FT1
Pt. is stable at this time. No complaints of pain voiced at this time. Pt. NPO since 8 am and is aware[place].
Received patient alert and oriented x 3 in no acute distress from main ED. Patient presents to ed for lower abdominal pain 5/10  with cramping. Nausea denied at this time . Dilaudid given with relief in pain. 20 gauge saline lock noted to left ac patent and no alterations noted to site. IVF restarted and maintained. Patient ambulatory to bathroom with no difficulty. Clear diet maintained and tolerated well. No further distress noted at this time. Will continue to follow up with plan of care.

## 2022-01-19 NOTE — PROGRESS NOTE ADULT - PROBLEM SELECTOR PLAN 1
- P/w  RUQ pain started 2 days ago   - US liver showed biliary sludge   - NPO for choloe  - C/w Tylenol and Dilaudid for Pain PRN   - C/w IV fluids  - No intervention for GI  - surgery following

## 2022-01-20 LAB
ALBUMIN SERPL ELPH-MCNC: 3.2 G/DL — LOW (ref 3.5–5)
ALP SERPL-CCNC: 124 U/L — HIGH (ref 40–120)
ALT FLD-CCNC: 188 U/L DA — HIGH (ref 10–60)
ANION GAP SERPL CALC-SCNC: 5 MMOL/L — SIGNIFICANT CHANGE UP (ref 5–17)
AST SERPL-CCNC: 160 U/L — HIGH (ref 10–40)
BILIRUB SERPL-MCNC: 1.1 MG/DL — SIGNIFICANT CHANGE UP (ref 0.2–1.2)
BUN SERPL-MCNC: 6 MG/DL — LOW (ref 7–18)
CALCIUM SERPL-MCNC: 9 MG/DL — SIGNIFICANT CHANGE UP (ref 8.4–10.5)
CHLORIDE SERPL-SCNC: 105 MMOL/L — SIGNIFICANT CHANGE UP (ref 96–108)
CO2 SERPL-SCNC: 28 MMOL/L — SIGNIFICANT CHANGE UP (ref 22–31)
CREAT SERPL-MCNC: 1.42 MG/DL — HIGH (ref 0.5–1.3)
GLUCOSE SERPL-MCNC: 109 MG/DL — HIGH (ref 70–99)
HCT VFR BLD CALC: 39.3 % — SIGNIFICANT CHANGE UP (ref 39–50)
HGB BLD-MCNC: 13.1 G/DL — SIGNIFICANT CHANGE UP (ref 13–17)
MCHC RBC-ENTMCNC: 28.3 PG — SIGNIFICANT CHANGE UP (ref 27–34)
MCHC RBC-ENTMCNC: 33.3 GM/DL — SIGNIFICANT CHANGE UP (ref 32–36)
MCV RBC AUTO: 84.9 FL — SIGNIFICANT CHANGE UP (ref 80–100)
NRBC # BLD: 0 /100 WBCS — SIGNIFICANT CHANGE UP (ref 0–0)
PLATELET # BLD AUTO: 328 K/UL — SIGNIFICANT CHANGE UP (ref 150–400)
POTASSIUM SERPL-MCNC: 3.9 MMOL/L — SIGNIFICANT CHANGE UP (ref 3.5–5.3)
POTASSIUM SERPL-SCNC: 3.9 MMOL/L — SIGNIFICANT CHANGE UP (ref 3.5–5.3)
PROT SERPL-MCNC: 7.7 G/DL — SIGNIFICANT CHANGE UP (ref 6–8.3)
RBC # BLD: 4.63 M/UL — SIGNIFICANT CHANGE UP (ref 4.2–5.8)
RBC # FLD: 13.3 % — SIGNIFICANT CHANGE UP (ref 10.3–14.5)
SODIUM SERPL-SCNC: 138 MMOL/L — SIGNIFICANT CHANGE UP (ref 135–145)
WBC # BLD: 10.07 K/UL — SIGNIFICANT CHANGE UP (ref 3.8–10.5)
WBC # FLD AUTO: 10.07 K/UL — SIGNIFICANT CHANGE UP (ref 3.8–10.5)

## 2022-01-20 PROCEDURE — 99232 SBSQ HOSP IP/OBS MODERATE 35: CPT | Mod: 57

## 2022-01-20 PROCEDURE — 99233 SBSQ HOSP IP/OBS HIGH 50: CPT

## 2022-01-20 RX ORDER — CHLORHEXIDINE GLUCONATE 213 G/1000ML
1 SOLUTION TOPICAL
Refills: 0 | Status: DISCONTINUED | OUTPATIENT
Start: 2022-01-20 | End: 2022-01-24

## 2022-01-20 RX ORDER — KETOROLAC TROMETHAMINE 30 MG/ML
30 SYRINGE (ML) INJECTION EVERY 6 HOURS
Refills: 0 | Status: DISCONTINUED | OUTPATIENT
Start: 2022-01-20 | End: 2022-01-21

## 2022-01-20 RX ORDER — ACETAMINOPHEN 500 MG
1000 TABLET ORAL ONCE
Refills: 0 | Status: COMPLETED | OUTPATIENT
Start: 2022-01-20 | End: 2022-01-20

## 2022-01-20 RX ORDER — HYDROMORPHONE HYDROCHLORIDE 2 MG/ML
1 INJECTION INTRAMUSCULAR; INTRAVENOUS; SUBCUTANEOUS EVERY 6 HOURS
Refills: 0 | Status: DISCONTINUED | OUTPATIENT
Start: 2022-01-20 | End: 2022-01-21

## 2022-01-20 RX ORDER — ACETAMINOPHEN 500 MG
650 TABLET ORAL EVERY 4 HOURS
Refills: 0 | Status: DISCONTINUED | OUTPATIENT
Start: 2022-01-20 | End: 2022-01-22

## 2022-01-20 RX ORDER — OXYCODONE HYDROCHLORIDE 5 MG/1
10 TABLET ORAL EVERY 6 HOURS
Refills: 0 | Status: DISCONTINUED | OUTPATIENT
Start: 2022-01-20 | End: 2022-01-20

## 2022-01-20 RX ORDER — HYDROMORPHONE HYDROCHLORIDE 2 MG/ML
0.5 INJECTION INTRAMUSCULAR; INTRAVENOUS; SUBCUTANEOUS ONCE
Refills: 0 | Status: DISCONTINUED | OUTPATIENT
Start: 2022-01-20 | End: 2022-01-20

## 2022-01-20 RX ORDER — SODIUM CHLORIDE 9 MG/ML
1000 INJECTION, SOLUTION INTRAVENOUS
Refills: 0 | Status: DISCONTINUED | OUTPATIENT
Start: 2022-01-20 | End: 2022-01-21

## 2022-01-20 RX ORDER — OXYCODONE HYDROCHLORIDE 5 MG/1
5 TABLET ORAL EVERY 6 HOURS
Refills: 0 | Status: DISCONTINUED | OUTPATIENT
Start: 2022-01-20 | End: 2022-01-20

## 2022-01-20 RX ADMIN — HYDROMORPHONE HYDROCHLORIDE 0.5 MILLIGRAM(S): 2 INJECTION INTRAMUSCULAR; INTRAVENOUS; SUBCUTANEOUS at 02:35

## 2022-01-20 RX ADMIN — SODIUM CHLORIDE 100 MILLILITER(S): 9 INJECTION, SOLUTION INTRAVENOUS at 13:35

## 2022-01-20 RX ADMIN — Medication 400 MILLIGRAM(S): at 13:35

## 2022-01-20 RX ADMIN — HYDROMORPHONE HYDROCHLORIDE 0.5 MILLIGRAM(S): 2 INJECTION INTRAMUSCULAR; INTRAVENOUS; SUBCUTANEOUS at 05:34

## 2022-01-20 RX ADMIN — CHLORHEXIDINE GLUCONATE 1 APPLICATION(S): 213 SOLUTION TOPICAL at 19:25

## 2022-01-20 RX ADMIN — OXYCODONE HYDROCHLORIDE 10 MILLIGRAM(S): 5 TABLET ORAL at 22:00

## 2022-01-20 RX ADMIN — Medication 1000 MILLIGRAM(S): at 14:00

## 2022-01-20 RX ADMIN — PANTOPRAZOLE SODIUM 40 MILLIGRAM(S): 20 TABLET, DELAYED RELEASE ORAL at 06:28

## 2022-01-20 RX ADMIN — OXYCODONE HYDROCHLORIDE 10 MILLIGRAM(S): 5 TABLET ORAL at 21:08

## 2022-01-20 NOTE — CHART NOTE - NSCHARTNOTEFT_GEN_A_CORE
EVENT: Pain 10/10 refusing oxycodone    BRIEF HPI: 31 years old male with no past medical history presented to ED due to right upper quadrant pain started 2 days ago. Patient stated that since Jan 1st he started to follow strict juice diet but for last couple days he started to have solid food. His pain is RUQ, 5/10, moves sometimes to left side, non radiating anywhere. Patient also was tested positive for COVID but Asymptomatic. He is Unvaccinated. Patient doesn't take any medications at home.     OBJECTIVE:  Vital Signs Last 24 Hrs  T(C): 37.3 (20 Jan 2022 20:43), Max: 37.6 (20 Jan 2022 12:54)  T(F): 99.1 (20 Jan 2022 20:43), Max: 99.6 (20 Jan 2022 12:54)  HR: 95 (20 Jan 2022 20:43) (82 - 97)  BP: 129/84 (20 Jan 2022 20:43) (129/84 - 135/88)  BP(mean): --  RR: 18 (20 Jan 2022 20:43) (17 - 18)  SpO2: 100% (20 Jan 2022 20:43) (99% - 100%)    FOCUSED PHYSICAL EXAM:    LABS:                        13.1   10.07 )-----------( 328      ( 20 Jan 2022 07:07 )             39.3     01-20    138  |  105  |  6<L>  ----------------------------<  109<H>  3.9   |  28  |  1.42<H>    Ca    9.0      20 Jan 2022 07:07  Phos  2.1     01-19  Mg     2.1     01-19    TPro  7.7  /  Alb  3.2<L>  /  TBili  1.1  /  DBili  x   /  AST  160<H>  /  ALT  188<H>  /  AlkPhos  124<H>  01-20      EKG:   IMGAGING:    ASSESSMENT:  HPI:  Patient is 31 years old male with no past medical history presented to ED due to right upper quadrant pain started 2 days ago. Patient stated that since Jan 1st he started to follow strict juice diet but for last couple days he started to have solid food. His pain is RUQ, 5/10, moves sometimes to left side, non radiating anywhere. Patient also was tested positive for COVID but Asymptomatic. He is Unvaccinated. Patient doesn't take any medications at home. He denied chest pain, N/V or change in bowel movement.  (18 Jan 2022 06:41)      PLAN:   MEDICATIONS  (STANDING):  chlorhexidine 2% Cloths 1 Application(s) Topical two times a day  enoxaparin Injectable 40 milliGRAM(s) SubCutaneous daily  lactated ringers. 1000 milliLiter(s) (100 mL/Hr) IV Continuous <Continuous>  lactated ringers. 1000 milliLiter(s) (100 mL/Hr) IV Continuous <Continuous>  pantoprazole    Tablet 40 milliGRAM(s) Oral before breakfast    MEDICATIONS  (PRN):  acetaminophen     Tablet .. 650 milliGRAM(s) Oral every 4 hours PRN Mild Pain (1 - 3)  HYDROmorphone  Injectable 1 milliGRAM(s) IV Push every 6 hours PRN Severe Pain (7 - 10)  ketorolac   Injectable 30 milliGRAM(s) IV Push every 6 hours PRN Moderate Pain (4 - 6)  ondansetron Injectable 4 milliGRAM(s) IV Push every 6 hours PRN Nausea and/or Vomiting    FOLLOW UP / RESULT: EVENT: Pain 10/10 refusing oxycodone    BRIEF HPI: 31 years old male with no past medical history presented to ED due to right upper quadrant pain started 2 days ago. Patient stated that since Jan 1st he started to follow strict juice diet but for last couple days he started to have solid food. His pain is RUQ, 5/10, moves sometimes to left side, non radiating anywhere. Patient also was tested positive for COVID but Asymptomatic. He is Unvaccinated. Patient doesn't take any medications at home.     OBJECTIVE:  Vital Signs Last 24 Hrs  T(C): 37.3 (20 Jan 2022 20:43), Max: 37.6 (20 Jan 2022 12:54)  T(F): 99.1 (20 Jan 2022 20:43), Max: 99.6 (20 Jan 2022 12:54)  HR: 95 (20 Jan 2022 20:43) (82 - 97)  BP: 129/84 (20 Jan 2022 20:43) (129/84 - 135/88)  BP(mean): --  RR: 18 (20 Jan 2022 20:43) (17 - 18)  SpO2: 100% (20 Jan 2022 20:43) (99% - 100%)    FOCUSED PHYSICAL EXAM: see in pt PE        LABS:                        13.1   10.07 )-----------( 328      ( 20 Jan 2022 07:07 )             39.3     01-20    138  |  105  |  6<L>  ----------------------------<  109<H>  3.9   |  28  |  1.42<H>    Ca    9.0      20 Jan 2022 07:07  Phos  2.1     01-19  Mg     2.1     01-19    TPro  7.7  /  Alb  3.2<L>  /  TBili  1.1  /  DBili  x   /  AST  160<H>  /  ALT  188<H>  /  AlkPhos  124<H>  01-20    IMAGING:  ACC: [id number] EXAM:  CT ABDOMEN AND PELVIS IC                        PROCEDURE DATE:  01/17/2022    IMPRESSION:  No pericholecystic inflammation. Question mild gallbladder wall   thickening.  Correlation with ultrasound is recommended.  Mild left intrahepatic ductal dilatation. Normal caliber CBD. Findings   are of uncertain etiology. Nonurgent follow-up MRI/MRCP is recommended   for further evaluation.    PROBLEM: Pain due to biliary colic   PLAN:   1. Acetaminophen  Tablet 650 mana GRAM(s) Oral every 4 hours PRN Mild Pain (1 - 3)  2. Hydromorphone  Injectable 1 mana GRAM(s) IV Push every 6 hours PRN Severe Pain (7 - 10)  3. ketorolac   Injectable 30 mana GRAM(s) IV Push every 6 hours PRN Moderate Pain (4 - 6)  4. Laparoscopic cholecystectomy 1/21    FOLLOW UP / RESULT: effectiveness of medication

## 2022-01-20 NOTE — PROGRESS NOTE ADULT - NSPROGADDITIONALINFOA_GEN_ALL_CORE
Patient admitted with sludge and acute cholecystitis. Radiology studies reviewed. Tender RUQ abdomen. Laparoscopic cholecystectomy possible open was discussed. The potential for bleeding, CBD injury, bowel injury and other related complications were discussed. All the questions were answered.  He is covid positive and would need a room and availability for the surgery  Discussed with him, if available will go ahead withe surgery  He had refused surgery yesterday and so was not scheduled for surgery
Patient admitted with sludge and acute cholecystitis. Radiology studies reviewed. Tender RUQ abdomen. Laparoscopic cholecystectomy possible open was discussed. The potential for bleeding, CBD injury, bowel injury and other related complications were discussed. All the questions were answered.  Informed consent for laparoscopic cholecystectomy possible open surgery was obtained

## 2022-01-20 NOTE — PROGRESS NOTE ADULT - ASSESSMENT
31 year old male with acute cholecystitis and COVID19    - plan for laparoscopic cholecystectomy in AM   - NPO after midnight  - please check preop labs  - chlorhexidine wash   - fluoroscopy to be ordered  - medical clearance/optimization appreciated 31 year old male with symptomatic cholelithiasis vs. acute cholecystitis and COVID19    - plan for laparoscopic cholecystectomy in AM   - NPO after midnight  - please check preop labs  - chlorhexidine wash   - fluoroscopy to be ordered  - medical clearance/optimization appreciated

## 2022-01-20 NOTE — PROGRESS NOTE ADULT - SUBJECTIVE AND OBJECTIVE BOX
Chart and meds reviewed.  Patient seen and examined; d/w IDR team, consultants,  pt, and primary RN    HPI: Patient is 31 years old male with no past medical history presented to ED due to right upper quadrant pain started 2 days ago. Patient stated that since Jan 1st he started to follow strict juice diet but for last couple days he started to have solid food. His pain is RUQ, 5/10, moves sometimes to left side, non radiating anywhere. Patient also was tested positive for COVID but Asymptomatic. He is Unvaccinated. Patient doesn't take any medications at home. He denied chest pain, N/V or change in bowel movement.  (18 Jan 2022 06:41)    Interval Hx: still having significant abdominal pain minimal relief with oral tylenol; he gets pain at rest and worst with movement, tolerated apple juice today, denies N/V    MEDICATIONS  (STANDING):  enoxaparin Injectable 40 milliGRAM(s) SubCutaneous daily  lactated ringers. 1000 milliLiter(s) (100 mL/Hr) IV Continuous <Continuous>  lactated ringers. 1000 milliLiter(s) (100 mL/Hr) IV Continuous <Continuous>  pantoprazole    Tablet 40 milliGRAM(s) Oral before breakfast    MEDICATIONS  (PRN):  ondansetron Injectable 4 milliGRAM(s) IV Push every 6 hours PRN Nausea and/or Vomiting      VITALS:  Vital Signs Last 24 Hrs  T(C): 37.6 (20 Jan 2022 12:54), Max: 37.6 (20 Jan 2022 12:54)  T(F): 99.6 (20 Jan 2022 12:54), Max: 99.6 (20 Jan 2022 12:54)  HR: 97 (20 Jan 2022 12:54) (82 - 106)  BP: 132/78 (20 Jan 2022 12:54) (120/70 - 135/88)  BP(mean): --  RR: 17 (20 Jan 2022 12:54) (17 - 18)  SpO2: 99% (20 Jan 2022 12:54) (98% - 99%)      PHYSICAL EXAM:    General: obese, NAD    HEENT:  pupils equal and reactive, EOMI, no oropharyngeal lesions, erythema, exudates, oral thrush    NECK:   supple, no carotid bruits, no palpable lymph nodes, no thyromegaly    CV:  +S1, +S2, regular, no murmurs or rubs    RESP:   lungs clear to auscultation bilaterally, no wheezing, rales, rhonchi, good air entry bilaterally    BREAST:  not examined    GI:  abdomen soft, +tenderness >RUQ, non-distended, no bruits, no palpable masses    :  voiding well spontaneously    MSK:   normal muscle tone, no atrophy, no rigidity, no contractions    EXT:   no clubbing, no cyanosis, no edema, no calf pain, swelling or erythema    VASCULAR:  pulses equal and symmetric in the upper and lower extremities    NEURO:  AAOX3, no focal neurological deficits, follows all commands, able to move extremities spontaneously    SKIN:  no ulcers, lesions or rashes      LABS:                        13.1   10.07 )-----------( 328      ( 20 Jan 2022 07:07 )             39.3     01-20    138  |  105  |  6<L>  ----------------------------<  109<H>  3.9   |  28  |  1.42<H>    Ca    9.0      20 Jan 2022 07:07  Phos  2.1     01-19  Mg     2.1     01-19    TPro  7.7  /  Alb  3.2<L>  /  TBili  1.1  /  DBili  x   /  AST  160<H>  /  ALT  188<H>  /  AlkPhos  124<H>  01-20    LIVER FUNCTIONS - ( 20 Jan 2022 07:07 )  Alb: 3.2 g/dL / Pro: 7.7 g/dL / ALK PHOS: 124 U/L / ALT: 188 U/L DA / AST: 160 U/L / GGT: x           Serum Pro-Brain Natriuretic Peptide: 33 pg/mL (01-18-22 @ 03:13)    PERTINENT DIAGNOSTICS  < from: CT Abdomen and Pelvis w/ IV Cont (01.17.22 @ 05:28) >  IMPRESSION:  No pericholecystic inflammation. Question mild gallbladder wall   thickening.  Correlation with ultrasound is recommended.    Mild left intrahepatic ductal dilatation. Normal caliber CBD. Findings   are of uncertain etiology. Nonurgent follow-up MRI/MRCP is recommended   for further evaluation.    < end of copied text >      A/P  31 years old Man no known medical hx who on 1/18 presented from home to ED for 2 day hx of RUQ pain; US liver showed biliary sludge but no stones. Admitted for RUQ and possible Cholecystectomy     - evaluated by GI on admission - no GI intervention,  - evaluated by surgery team who con't to follow pt for possible ángel tmrw as per d/w team today  - afebrile / WBC normal  - NPO post midnight  - pre-op labs  - currently on clears  - minimal relief with po tylenol; dosed with IV tylenol today  - Alk phos & AST increase  - con't IVF    THEA  - Scr slight increase today from yesterday but lower than on admission  - con't IVF  - monitor BMP

## 2022-01-20 NOTE — PROGRESS NOTE ADULT - SUBJECTIVE AND OBJECTIVE BOX
Patient seen and examined at bedside  Patient complains of 6/10 abdominal pain which he states is improved with Dilaudid     Vital Signs Last 24 Hrs  T(F): 99.6 (01-20-22 @ 12:54), Max: 99.6 (01-20-22 @ 12:54)  HR: 97 (01-20-22 @ 12:54)  BP: 132/78 (01-20-22 @ 12:54)  RR: 17 (01-20-22 @ 12:54)  SpO2: 99% (01-20-22 @ 12:54)    GENERAL: Alert, NAD  CHEST/LUNG: respirations nonlabored  ABDOMEN: soft, +RUQ abdominal pain on palpation  EXTREMITIES:  no calf tenderness, No edema    I&O's Detail    LABS:                        13.1   10.07 )-----------( 328      ( 20 Jan 2022 07:07 )             39.3     01-20    138  |  105  |  6<L>  ----------------------------<  109<H>  3.9   |  28  |  1.42<H>    Ca    9.0      20 Jan 2022 07:07  Phos  2.1     01-19  Mg     2.1     01-19    TPro  7.7  /  Alb  3.2<L>  /  TBili  1.1  /  DBili  x   /  AST  160<H>  /  ALT  188<H>  /  AlkPhos  124<H>  01-20

## 2022-01-21 LAB
ALBUMIN SERPL ELPH-MCNC: 2.9 G/DL — LOW (ref 3.5–5)
ANION GAP SERPL CALC-SCNC: 6 MMOL/L — SIGNIFICANT CHANGE UP (ref 5–17)
BASOPHILS # BLD AUTO: 0.02 K/UL — SIGNIFICANT CHANGE UP (ref 0–0.2)
BASOPHILS NFR BLD AUTO: 0.2 % — SIGNIFICANT CHANGE UP (ref 0–2)
BLD GP AB SCN SERPL QL: SIGNIFICANT CHANGE UP
BUN SERPL-MCNC: 7 MG/DL — SIGNIFICANT CHANGE UP (ref 7–18)
CALCIUM SERPL-MCNC: 9.6 MG/DL — SIGNIFICANT CHANGE UP (ref 8.4–10.5)
CHLORIDE SERPL-SCNC: 105 MMOL/L — SIGNIFICANT CHANGE UP (ref 96–108)
CO2 SERPL-SCNC: 30 MMOL/L — SIGNIFICANT CHANGE UP (ref 22–31)
EOSINOPHIL # BLD AUTO: 0.08 K/UL — SIGNIFICANT CHANGE UP (ref 0–0.5)
EOSINOPHIL NFR BLD AUTO: 0.7 % — SIGNIFICANT CHANGE UP (ref 0–6)
GLUCOSE SERPL-MCNC: 103 MG/DL — HIGH (ref 70–99)
GRAM STN FLD: SIGNIFICANT CHANGE UP
HCT VFR BLD CALC: 36.7 % — LOW (ref 39–50)
HGB BLD-MCNC: 12.1 G/DL — LOW (ref 13–17)
IMM GRANULOCYTES NFR BLD AUTO: 0.4 % — SIGNIFICANT CHANGE UP (ref 0–1.5)
INR BLD: 1.2 RATIO — HIGH (ref 0.88–1.16)
LYMPHOCYTES # BLD AUTO: 1.59 K/UL — SIGNIFICANT CHANGE UP (ref 1–3.3)
LYMPHOCYTES # BLD AUTO: 14.8 % — SIGNIFICANT CHANGE UP (ref 13–44)
MAGNESIUM SERPL-MCNC: 2.3 MG/DL — SIGNIFICANT CHANGE UP (ref 1.6–2.6)
MCHC RBC-ENTMCNC: 28 PG — SIGNIFICANT CHANGE UP (ref 27–34)
MCHC RBC-ENTMCNC: 33 GM/DL — SIGNIFICANT CHANGE UP (ref 32–36)
MCV RBC AUTO: 85 FL — SIGNIFICANT CHANGE UP (ref 80–100)
MONOCYTES # BLD AUTO: 1.28 K/UL — HIGH (ref 0–0.9)
MONOCYTES NFR BLD AUTO: 11.9 % — SIGNIFICANT CHANGE UP (ref 2–14)
NEUTROPHILS # BLD AUTO: 7.75 K/UL — HIGH (ref 1.8–7.4)
NEUTROPHILS NFR BLD AUTO: 72 % — SIGNIFICANT CHANGE UP (ref 43–77)
NRBC # BLD: 0 /100 WBCS — SIGNIFICANT CHANGE UP (ref 0–0)
PLATELET # BLD AUTO: 359 K/UL — SIGNIFICANT CHANGE UP (ref 150–400)
POTASSIUM SERPL-MCNC: 4 MMOL/L — SIGNIFICANT CHANGE UP (ref 3.5–5.3)
POTASSIUM SERPL-SCNC: 4 MMOL/L — SIGNIFICANT CHANGE UP (ref 3.5–5.3)
PROTHROM AB SERPL-ACNC: 14.2 SEC — HIGH (ref 10.6–13.6)
RBC # BLD: 4.32 M/UL — SIGNIFICANT CHANGE UP (ref 4.2–5.8)
RBC # FLD: 13.1 % — SIGNIFICANT CHANGE UP (ref 10.3–14.5)
SODIUM SERPL-SCNC: 141 MMOL/L — SIGNIFICANT CHANGE UP (ref 135–145)
SPECIMEN SOURCE: SIGNIFICANT CHANGE UP
WBC # BLD: 10.76 K/UL — HIGH (ref 3.8–10.5)
WBC # FLD AUTO: 10.76 K/UL — HIGH (ref 3.8–10.5)

## 2022-01-21 PROCEDURE — 99233 SBSQ HOSP IP/OBS HIGH 50: CPT

## 2022-01-21 PROCEDURE — 47562 LAPAROSCOPIC CHOLECYSTECTOMY: CPT

## 2022-01-21 PROCEDURE — 88304 TISSUE EXAM BY PATHOLOGIST: CPT | Mod: 26

## 2022-01-21 DEVICE — CLIP APPLIER COVIDIEN ENDOCLIP 10MM LARGE: Type: IMPLANTABLE DEVICE | Status: FUNCTIONAL

## 2022-01-21 DEVICE — CLIP APPLIER COVIDIEN ENDOCLIP II 10MM MED/LG: Type: IMPLANTABLE DEVICE | Status: FUNCTIONAL

## 2022-01-21 RX ORDER — CIPROFLOXACIN LACTATE 400MG/40ML
400 VIAL (ML) INTRAVENOUS EVERY 12 HOURS
Refills: 0 | Status: DISCONTINUED | OUTPATIENT
Start: 2022-01-22 | End: 2022-01-24

## 2022-01-21 RX ORDER — METRONIDAZOLE 500 MG
500 TABLET ORAL EVERY 8 HOURS
Refills: 0 | Status: DISCONTINUED | OUTPATIENT
Start: 2022-01-21 | End: 2022-01-24

## 2022-01-21 RX ORDER — CIPROFLOXACIN LACTATE 400MG/40ML
VIAL (ML) INTRAVENOUS
Refills: 0 | Status: DISCONTINUED | OUTPATIENT
Start: 2022-01-21 | End: 2022-01-24

## 2022-01-21 RX ORDER — METRONIDAZOLE 500 MG
TABLET ORAL
Refills: 0 | Status: DISCONTINUED | OUTPATIENT
Start: 2022-01-21 | End: 2022-01-24

## 2022-01-21 RX ORDER — SODIUM CHLORIDE 9 MG/ML
1000 INJECTION, SOLUTION INTRAVENOUS
Refills: 0 | Status: DISCONTINUED | OUTPATIENT
Start: 2022-01-21 | End: 2022-01-22

## 2022-01-21 RX ORDER — KETOROLAC TROMETHAMINE 30 MG/ML
15 SYRINGE (ML) INJECTION EVERY 6 HOURS
Refills: 0 | Status: DISCONTINUED | OUTPATIENT
Start: 2022-01-21 | End: 2022-01-22

## 2022-01-21 RX ORDER — METRONIDAZOLE 500 MG
500 TABLET ORAL ONCE
Refills: 0 | Status: COMPLETED | OUTPATIENT
Start: 2022-01-21 | End: 2022-01-21

## 2022-01-21 RX ORDER — HYDROMORPHONE HYDROCHLORIDE 2 MG/ML
1 INJECTION INTRAMUSCULAR; INTRAVENOUS; SUBCUTANEOUS EVERY 6 HOURS
Refills: 0 | Status: DISCONTINUED | OUTPATIENT
Start: 2022-01-21 | End: 2022-01-22

## 2022-01-21 RX ORDER — HYDROMORPHONE HYDROCHLORIDE 2 MG/ML
0.5 INJECTION INTRAMUSCULAR; INTRAVENOUS; SUBCUTANEOUS
Refills: 0 | Status: DISCONTINUED | OUTPATIENT
Start: 2022-01-21 | End: 2022-01-21

## 2022-01-21 RX ORDER — CIPROFLOXACIN LACTATE 400MG/40ML
400 VIAL (ML) INTRAVENOUS ONCE
Refills: 0 | Status: COMPLETED | OUTPATIENT
Start: 2022-01-21 | End: 2022-01-21

## 2022-01-21 RX ORDER — HYDROMORPHONE HYDROCHLORIDE 2 MG/ML
1 INJECTION INTRAMUSCULAR; INTRAVENOUS; SUBCUTANEOUS
Refills: 0 | Status: DISCONTINUED | OUTPATIENT
Start: 2022-01-21 | End: 2022-01-21

## 2022-01-21 RX ADMIN — Medication 200 MILLIGRAM(S): at 18:09

## 2022-01-21 RX ADMIN — HYDROMORPHONE HYDROCHLORIDE 1 MILLIGRAM(S): 2 INJECTION INTRAMUSCULAR; INTRAVENOUS; SUBCUTANEOUS at 15:11

## 2022-01-21 RX ADMIN — CHLORHEXIDINE GLUCONATE 1 APPLICATION(S): 213 SOLUTION TOPICAL at 06:28

## 2022-01-21 RX ADMIN — Medication 100 MILLIGRAM(S): at 21:49

## 2022-01-21 RX ADMIN — HYDROMORPHONE HYDROCHLORIDE 1 MILLIGRAM(S): 2 INJECTION INTRAMUSCULAR; INTRAVENOUS; SUBCUTANEOUS at 12:14

## 2022-01-21 RX ADMIN — SODIUM CHLORIDE 125 MILLILITER(S): 9 INJECTION, SOLUTION INTRAVENOUS at 21:53

## 2022-01-21 RX ADMIN — Medication 100 MILLIGRAM(S): at 17:03

## 2022-01-21 RX ADMIN — HYDROMORPHONE HYDROCHLORIDE 1 MILLIGRAM(S): 2 INJECTION INTRAMUSCULAR; INTRAVENOUS; SUBCUTANEOUS at 14:20

## 2022-01-21 RX ADMIN — HYDROMORPHONE HYDROCHLORIDE 1 MILLIGRAM(S): 2 INJECTION INTRAMUSCULAR; INTRAVENOUS; SUBCUTANEOUS at 11:04

## 2022-01-21 RX ADMIN — HYDROMORPHONE HYDROCHLORIDE 1 MILLIGRAM(S): 2 INJECTION INTRAMUSCULAR; INTRAVENOUS; SUBCUTANEOUS at 20:58

## 2022-01-21 RX ADMIN — SODIUM CHLORIDE 125 MILLILITER(S): 9 INJECTION, SOLUTION INTRAVENOUS at 14:37

## 2022-01-21 RX ADMIN — HYDROMORPHONE HYDROCHLORIDE 1 MILLIGRAM(S): 2 INJECTION INTRAMUSCULAR; INTRAVENOUS; SUBCUTANEOUS at 21:15

## 2022-01-21 NOTE — PROGRESS NOTE ADULT - SUBJECTIVE AND OBJECTIVE BOX
Chart and meds reviewed.  Patient seen and examined s/p Clara neal.    HPI: onesimo is 31 years old male with no past medical history presented to ED due to right upper quadrant pain started 2 days ago. Patient stated that since Jan 1st he started to follow strict juice diet but for last couple days he started to have solid food. His pain is RUQ, 5/10, moves sometimes to left side, non radiating anywhere. Patient also was tested positive for COVID but Asymptomatic. He is Unvaccinated. Patient doesn't take any medications at home. He denied chest pain, N/V or change in bowel movement.  (18 Jan 2022 06:41)      Interval hx: s/p lab ángel today; no n/v      MEDICATIONS  (STANDING):  chlorhexidine 2% Cloths 1 Application(s) Topical two times a day  ciprofloxacin   IVPB      ciprofloxacin   IVPB 400 milliGRAM(s) IV Intermittent once  lactated ringers. 1000 milliLiter(s) (125 mL/Hr) IV Continuous <Continuous>  metroNIDAZOLE  IVPB      metroNIDAZOLE  IVPB 500 milliGRAM(s) IV Intermittent every 8 hours  metroNIDAZOLE  IVPB 500 milliGRAM(s) IV Intermittent once  pantoprazole    Tablet 40 milliGRAM(s) Oral before breakfast    MEDICATIONS  (PRN):  acetaminophen     Tablet .. 650 milliGRAM(s) Oral every 4 hours PRN Mild Pain (1 - 3)  HYDROmorphone  Injectable 1 milliGRAM(s) IV Push every 6 hours PRN Severe Pain (7 - 10)  ondansetron Injectable 4 milliGRAM(s) IV Push every 6 hours PRN Nausea and/or Vomiting      VITALS:  Vital Signs Last 24 Hrs  T(C): 36.4 (21 Jan 2022 12:59), Max: 37.4 (21 Jan 2022 05:38)  T(F): 97.5 (21 Jan 2022 12:59), Max: 99.4 (21 Jan 2022 05:38)  HR: 96 (21 Jan 2022 12:59) (94 - 103)  BP: 117/72 (21 Jan 2022 12:59) (117/72 - 151/84)  BP(mean): 82 (21 Jan 2022 11:03) (81 - 100)  RR: 19 (21 Jan 2022 12:59) (14 - 20)  SpO2: 98% (21 Jan 2022 12:59) (96% - 100%)      PHYSICAL EXAM:    GENERAL: Obese    HEENT:  pupils equal and reactive, EOMI, no oropharyngeal lesions, erythema, exudates, oral thrush    NECK:   supple, no carotid bruits, no palpable lymph nodes, no thyromegaly    CV:  +S1, +S2, regular, no murmurs or rubs    RESP:   lungs clear to auscultation bilaterally, no wheezing, rales, rhonchi, good air entry bilaterally    BREAST:  not examined    GI:  abdomen soft, generalized tenderness > at procedure site (an expected finding) non-tender, non-distended, diminished BS    RECTAL:  not examined    :  voiding spontaneously    MSK:   normal muscle tone, no atrophy, no rigidity, no contractions    EXT:   no clubbing, no cyanosis, no edema, no calf pain, swelling or erythema    VASCULAR:  pulses equal and symmetric in the upper and lower extremities    NEURO:  AAOX3, no focal neurological deficits, follows all commands, able to move extremities spontaneously    SKIN:  no ulcers, surgical dsg to RUQ CDI      LABS:                      12.1   10.76 )-----------( 359      ( 21 Jan 2022 06:10 )             36.7     01-21    141  |  105  |  7   ----------------------------<  103<H>  4.0   |  30  |  1.40<H>    Ca    9.6      21 Jan 2022 06:10  Mg     2.3     01-21    TPro  7.6  /  Alb  2.9<L>  /  TBili  0.8  /  DBili  x   /  AST  102<H>  /  ALT  169<H>  /  AlkPhos  128<H>  01-21    LIVER FUNCTIONS - ( 21 Jan 2022 06:10 )  Alb: 2.9 g/dL / Pro: 7.6 g/dL / ALK PHOS: 128 U/L / ALT: 169 U/L DA / AST: 102 U/L / GGT: x           PT/INR - ( 21 Jan 2022 06:10 )   PT: 14.2 sec;   INR: 1.20 ratio      Serum Pro-Brain Natriuretic Peptide: 33 pg/mL (01-18-22 @ 03:13)

## 2022-01-21 NOTE — DISCHARGE NOTE NURSING/CASE MANAGEMENT/SOCIAL WORK - PATIENT PORTAL LINK FT
You can access the FollowMyHealth Patient Portal offered by [facility] by registering at the following website: [email or web address]. By joining [facility]’s FollowMyHealth portal, you will also be able to view your health information using other applications (apps) compatible with our system.

## 2022-01-21 NOTE — DISCHARGE NOTE NURSING/CASE MANAGEMENT/SOCIAL WORK - NSDCFUADDAPPT_GEN_ALL_CORE_FT
Arline Rodriguez  [phone] [place] [id number] [phone]    WALK-IN for registration and appointment  SLIDING SCALE payment    Hours:  Monday-Thursday:  8am – 7pm  Friday & Saturday: 8am – 5pm    Please bring:  •	Passport/ID   •	Pay Stubs or letter of financial support   •	Proof of address   •	Discharge papers

## 2022-01-21 NOTE — PROGRESS NOTE ADULT - ATTENDING COMMENTS
Patient was seen and examined at bedside   Still complains of RUQ pain, 8/10    Vitals noted     P/E:  GENERAL: NAD, obese  HEAD:  Atraumatic, Normocephalic  NERVOUS SYSTEM:  Alert & Oriented X3, Good concentration; Motor Strength 5/5 B/L upper and lower extremities  CHEST/LUNG: Clear to auscultation bilaterally; No rales, rhonchi, wheezing, or rubs  HEART: Regular rate and rhythm; No murmurs, rubs, or gallops  ABDOMEN: Soft, RUQ tender, Nondistended; Bowel sounds present  EXTREMITIES:  2+ Peripheral Pulses, No clubbing, cyanosis, or edema  SKIN: No rashes or lesions    labs noted     A/P:  Biliary colic   Morbid Obese  COVID 19 infection  THEA    Plan:  Started on pain regimen   Cont IVF LR   Laparoscopic cholecystectomy tomorrow   RCRI score 0, 3.9% risk of MACE. patient is low risk for low risk procedure  NPO after midnight, pre-op labs   Not requiring O2, covid status stable   Cont Lovenox for DVT ppx
Patient was seen and examined at bedside   Complains of pain at the surgical site if he coughs, otherwise denies any complains     Vitals noted     P/E:  NAD, obese   AAOx3, no focal deficit   CTABL  S1S2 WNL, no MRG  Abd soft, tender at surgical site, SHARON drain draining blood ~10ml  BL LE no edema or calf tenderness       Labs noted     A/P:  Gangrenous GB with biliary colic, s/p Lap Bhumika   THEA  COVID pneumonia    Plan:  Patient tolerated procedure well  Closely monitor for bleeding, follow up Hb, monitor drain  Cont current pain management; bowel regimen if needed  Agree with Cipro and Flagyl for gangrenous GB  Hold Lovenox for DVT ppx  Incentive spirometry  Cont IVF, monitor renal function   Please avoid Ketorolac due to THEA   COVID status stable, not needing O2  Will add Robitussin for cough as it increases pain   Rest of the mangement as above
Patient was seen and examined at bedside   Reports pain has improved to 4/10 today  No nausea, waiting for follow up with surgery, agrees for surgery     Vitals noted    P/E:  NAD, obese  AAOx3,no focal deficit   CTABL  S1S2 WNL, no MRG   Abd soft, RUQ tenderness, other areas of abd non tender, BS present   BL LE no edema or calf tenderness     Labs noted     A/P:  Surgery cancelled for today, will keep patient on clear liquid diet, monitor pain without medications. If still symptomatic will need cholecystectomy   Rest of the management as above   Discussed with NP

## 2022-01-21 NOTE — DISCHARGE NOTE NURSING/CASE MANAGEMENT/SOCIAL WORK - NSDCPEFALRISK_GEN_ALL_CORE
For information on Fall & Injury Prevention, visit: [email or web address] OR  [email or web address] OR  https://www.cdc.gov/steadi/patient.html

## 2022-01-21 NOTE — PROGRESS NOTE ADULT - SUBJECTIVE AND OBJECTIVE BOX
SURGERY POST-OP    Subjective:  Pt resting comfortably. No acute complaints  Tolerating pain with meds  Tolerating liquid diet  Denies N/V  Voided post op      T(C): 36.4 (01-21-22 @ 12:59), Max: 37.4 (01-21-22 @ 05:38)  HR: 96 (01-21-22 @ 12:59) (94 - 103)  BP: 117/72 (01-21-22 @ 12:59) (117/72 - 151/84)  RR: 19 (01-21-22 @ 12:59) (14 - 20)  SpO2: 98% (01-21-22 @ 12:59) (96% - 100%)    I&O's Detail    20 Jan 2022 07:01  -  21 Jan 2022 07:00  --------------------------------------------------------  IN:    Lactated Ringers: 1200 mL  Total IN: 1200 mL    OUT:  Total OUT: 0 mL    Total NET: 1200 mL      21 Jan 2022 07:01  -  21 Jan 2022 18:27  --------------------------------------------------------  IN:    Lactated Ringers: 125 mL  Total IN: 125 mL    OUT:  Total OUT: 0 mL    Total NET: 125 mL          PHYSICAL EXAM:   General: Alert and oriented, not in acute distress  Resp: Breathing unlabored, satting well on RA  Abdomen: Soft ND, Dressing C/D/I, SHARON serosang  : No Moeller  Extremities: No pedal edema      31y.o. Male s/p lap cholecystectomy for gangrenous gallbladder 1/21/22, pod#1    - IVF until po adequate  - Pain control as needed  - SHARON drain 1x  - Abx, cipro/flagyl, might need 1wk course post discharge  - DVT ppx held for now  - Incentive spirometry  - advance to low fat diet tmrw

## 2022-01-21 NOTE — PROGRESS NOTE ADULT - ASSESSMENT
A/P  31 years old Man no known medical hx who on 1/18 presented from home to ED for 2 day hx of RUQ pain; US liver showed biliary sludge but no stones. Admitted for RUQ now s/p cholecystotecomy    - s/p Lap ángel today  - surgery team to f/u with pt later today (pt requested to speak to team, they were made aware)  - afebrile / WBC normal  - pain control with dilaudid postop  - started on cipro and flagyl (as per surgery team pt had heavy gangrenous gall bladder)  - con't IVF  -  OOB    THEA  - Scr remains elevated but still lower than on admission (1.62 on admission)  - con't IVF  - monitor BMP  - possible dc in 24 hrs

## 2022-01-22 LAB
ALBUMIN SERPL ELPH-MCNC: 2.6 G/DL — LOW (ref 3.5–5)
ALP SERPL-CCNC: 192 U/L — HIGH (ref 40–120)
ALT FLD-CCNC: 246 U/L DA — HIGH (ref 10–60)
ANION GAP SERPL CALC-SCNC: 2 MMOL/L — LOW (ref 5–17)
AST SERPL-CCNC: 224 U/L — HIGH (ref 10–40)
BILIRUB SERPL-MCNC: 0.6 MG/DL — SIGNIFICANT CHANGE UP (ref 0.2–1.2)
BUN SERPL-MCNC: 6 MG/DL — LOW (ref 7–18)
CALCIUM SERPL-MCNC: 8.7 MG/DL — SIGNIFICANT CHANGE UP (ref 8.4–10.5)
CHLORIDE SERPL-SCNC: 106 MMOL/L — SIGNIFICANT CHANGE UP (ref 96–108)
CO2 SERPL-SCNC: 32 MMOL/L — HIGH (ref 22–31)
CREAT SERPL-MCNC: 1.25 MG/DL — SIGNIFICANT CHANGE UP (ref 0.5–1.3)
GLUCOSE SERPL-MCNC: 130 MG/DL — HIGH (ref 70–99)
HCT VFR BLD CALC: 34.8 % — LOW (ref 39–50)
HGB BLD-MCNC: 11.3 G/DL — LOW (ref 13–17)
MAGNESIUM SERPL-MCNC: 2.2 MG/DL — SIGNIFICANT CHANGE UP (ref 1.6–2.6)
MCHC RBC-ENTMCNC: 28 PG — SIGNIFICANT CHANGE UP (ref 27–34)
MCHC RBC-ENTMCNC: 32.5 GM/DL — SIGNIFICANT CHANGE UP (ref 32–36)
MCV RBC AUTO: 86.1 FL — SIGNIFICANT CHANGE UP (ref 80–100)
NRBC # BLD: 0 /100 WBCS — SIGNIFICANT CHANGE UP (ref 0–0)
PLATELET # BLD AUTO: 339 K/UL — SIGNIFICANT CHANGE UP (ref 150–400)
POTASSIUM SERPL-MCNC: 4.1 MMOL/L — SIGNIFICANT CHANGE UP (ref 3.5–5.3)
POTASSIUM SERPL-SCNC: 4.1 MMOL/L — SIGNIFICANT CHANGE UP (ref 3.5–5.3)
PROT SERPL-MCNC: 7.1 G/DL — SIGNIFICANT CHANGE UP (ref 6–8.3)
RBC # BLD: 4.04 M/UL — LOW (ref 4.2–5.8)
RBC # FLD: 13.4 % — SIGNIFICANT CHANGE UP (ref 10.3–14.5)
SODIUM SERPL-SCNC: 140 MMOL/L — SIGNIFICANT CHANGE UP (ref 135–145)
WBC # BLD: 7.87 K/UL — SIGNIFICANT CHANGE UP (ref 3.8–10.5)
WBC # FLD AUTO: 7.87 K/UL — SIGNIFICANT CHANGE UP (ref 3.8–10.5)

## 2022-01-22 PROCEDURE — 99233 SBSQ HOSP IP/OBS HIGH 50: CPT

## 2022-01-22 RX ORDER — BENZOCAINE AND MENTHOL 5; 1 G/100ML; G/100ML
1 LIQUID ORAL EVERY 8 HOURS
Refills: 0 | Status: DISCONTINUED | OUTPATIENT
Start: 2022-01-22 | End: 2022-01-24

## 2022-01-22 RX ORDER — ACETAMINOPHEN 500 MG
1000 TABLET ORAL ONCE
Refills: 0 | Status: COMPLETED | OUTPATIENT
Start: 2022-01-22 | End: 2022-01-22

## 2022-01-22 RX ORDER — TRAMADOL HYDROCHLORIDE 50 MG/1
50 TABLET ORAL EVERY 6 HOURS
Refills: 0 | Status: DISCONTINUED | OUTPATIENT
Start: 2022-01-22 | End: 2022-01-22

## 2022-01-22 RX ORDER — SENNA PLUS 8.6 MG/1
2 TABLET ORAL AT BEDTIME
Refills: 0 | Status: DISCONTINUED | OUTPATIENT
Start: 2022-01-22 | End: 2022-01-24

## 2022-01-22 RX ORDER — POLYETHYLENE GLYCOL 3350 17 G/17G
17 POWDER, FOR SOLUTION ORAL DAILY
Refills: 0 | Status: DISCONTINUED | OUTPATIENT
Start: 2022-01-22 | End: 2022-01-24

## 2022-01-22 RX ORDER — HYDROMORPHONE HYDROCHLORIDE 2 MG/ML
2 INJECTION INTRAMUSCULAR; INTRAVENOUS; SUBCUTANEOUS EVERY 4 HOURS
Refills: 0 | Status: DISCONTINUED | OUTPATIENT
Start: 2022-01-22 | End: 2022-01-24

## 2022-01-22 RX ADMIN — Medication 1000 MILLIGRAM(S): at 22:27

## 2022-01-22 RX ADMIN — HYDROMORPHONE HYDROCHLORIDE 1 MILLIGRAM(S): 2 INJECTION INTRAMUSCULAR; INTRAVENOUS; SUBCUTANEOUS at 13:25

## 2022-01-22 RX ADMIN — Medication 100 MILLIGRAM(S): at 05:03

## 2022-01-22 RX ADMIN — PANTOPRAZOLE SODIUM 40 MILLIGRAM(S): 20 TABLET, DELAYED RELEASE ORAL at 05:12

## 2022-01-22 RX ADMIN — Medication 100 MILLIGRAM(S): at 13:21

## 2022-01-22 RX ADMIN — Medication 400 MILLIGRAM(S): at 21:37

## 2022-01-22 RX ADMIN — Medication 200 MILLIGRAM(S): at 17:15

## 2022-01-22 RX ADMIN — Medication 400 MILLIGRAM(S): at 14:35

## 2022-01-22 RX ADMIN — Medication 200 MILLIGRAM(S): at 05:03

## 2022-01-22 RX ADMIN — HYDROMORPHONE HYDROCHLORIDE 2 MILLIGRAM(S): 2 INJECTION INTRAMUSCULAR; INTRAVENOUS; SUBCUTANEOUS at 17:16

## 2022-01-22 RX ADMIN — BENZOCAINE AND MENTHOL 1 LOZENGE: 5; 1 LIQUID ORAL at 21:38

## 2022-01-22 RX ADMIN — Medication 15 MILLIGRAM(S): at 05:30

## 2022-01-22 RX ADMIN — Medication 100 MILLIGRAM(S): at 21:38

## 2022-01-22 RX ADMIN — HYDROMORPHONE HYDROCHLORIDE 1 MILLIGRAM(S): 2 INJECTION INTRAMUSCULAR; INTRAVENOUS; SUBCUTANEOUS at 02:29

## 2022-01-22 RX ADMIN — Medication 1000 MILLIGRAM(S): at 17:01

## 2022-01-22 RX ADMIN — HYDROMORPHONE HYDROCHLORIDE 1 MILLIGRAM(S): 2 INJECTION INTRAMUSCULAR; INTRAVENOUS; SUBCUTANEOUS at 11:40

## 2022-01-22 RX ADMIN — Medication 15 MILLIGRAM(S): at 05:12

## 2022-01-22 RX ADMIN — HYDROMORPHONE HYDROCHLORIDE 1 MILLIGRAM(S): 2 INJECTION INTRAMUSCULAR; INTRAVENOUS; SUBCUTANEOUS at 02:07

## 2022-01-22 RX ADMIN — HYDROMORPHONE HYDROCHLORIDE 2 MILLIGRAM(S): 2 INJECTION INTRAMUSCULAR; INTRAVENOUS; SUBCUTANEOUS at 19:00

## 2022-01-22 RX ADMIN — BENZOCAINE AND MENTHOL 1 LOZENGE: 5; 1 LIQUID ORAL at 14:36

## 2022-01-22 RX ADMIN — SENNA PLUS 2 TABLET(S): 8.6 TABLET ORAL at 21:38

## 2022-01-22 NOTE — CONSULT NOTE ADULT - PROBLEM SELECTOR RECOMMENDATION 9
Pt s/p winnie vincent pod#1  Mild pain - pain level 1-3  nonpharmacological measures  ice packs, heat packs, PT/OOB, guided imagery, distraction   Nonopioid recc  - Acetaminophen 1 gram ivpb for 2 doses - monitor lfts   - Gabapentin   - no nsaids as pt with petar  Opioid Recc  - no iv opioids  - pt refusing oxy - trial hydromorphone 2mg po q 6 hour prn   Bowel Regimen

## 2022-01-22 NOTE — PROGRESS NOTE ADULT - ASSESSMENT
Gangrenous GB with biliary colic, s/p Lap Bhumika   Post operative pain management   THEA  Transaminitis  COVID infection asymptomatic    Plan:  Will advance diet, Tylenol IV one dose (has uptrending LFTs), can give Tramadol if needed (Oxycodone does not work for him as per patient)  Please avoid NSAIDs due to labile renal function  Obtain CMP tomorrow  Agree with Cipro and Flagyl for gangrenous GB  Hold Lovenox for DVT ppx  Incentive spirometry  Cont IVF, monitor renal function   COVID status stable, not needing O2  Cont Robitussin for cough as it increases pain   Possible DC tomorrow if transaminitis improves and patient tolerates diet

## 2022-01-22 NOTE — PROGRESS NOTE ADULT - SUBJECTIVE AND OBJECTIVE BOX
INTERVAL HPI/OVERNIGHT EVENTS:  s/p laparoscopic cholecystectomy POD#1  Pt resting comfortably. No acute complaints.   Tolerating diet.    Denies N/V    MEDICATIONS  (STANDING):  chlorhexidine 2% Cloths 1 Application(s) Topical two times a day  ciprofloxacin   IVPB      ciprofloxacin   IVPB 400 milliGRAM(s) IV Intermittent every 12 hours  lactated ringers. 1000 milliLiter(s) (125 mL/Hr) IV Continuous <Continuous>  metroNIDAZOLE  IVPB      metroNIDAZOLE  IVPB 500 milliGRAM(s) IV Intermittent every 8 hours  pantoprazole    Tablet 40 milliGRAM(s) Oral before breakfast    MEDICATIONS  (PRN):  acetaminophen     Tablet .. 650 milliGRAM(s) Oral every 4 hours PRN Mild Pain (1 - 3)  HYDROmorphone  Injectable 1 milliGRAM(s) IV Push every 6 hours PRN Severe Pain (7 - 10)  ondansetron Injectable 4 milliGRAM(s) IV Push every 6 hours PRN Nausea and/or Vomiting  traMADol 50 milliGRAM(s) Oral every 6 hours PRN Moderate Pain (4 - 6)      Vital Signs Last 24 Hrs  T(C): 37 (22 Jan 2022 06:03), Max: 37 (22 Jan 2022 06:03)  T(F): 98.6 (22 Jan 2022 06:03), Max: 98.6 (22 Jan 2022 06:03)  HR: 89 (22 Jan 2022 06:03) (80 - 102)  BP: 138/86 (22 Jan 2022 06:03) (117/72 - 151/84)  BP(mean): 82 (21 Jan 2022 11:03) (81 - 100)  RR: 20 (22 Jan 2022 06:03) (14 - 20)  SpO2: 97% (22 Jan 2022 06:03) (96% - 99%)    Physical:  General: A&Ox3. NAD.  Chest: respiration unlabored  Abdomen: Soft nondistended, appropriate incisional tenderness. Dressing c/d/i. SHARON with 50cc serosanguineous output    I&O's Detail    21 Jan 2022 07:01  -  22 Jan 2022 07:00  --------------------------------------------------------  IN:    Lactated Ringers: 1375 mL  Total IN: 1375 mL    OUT:    Bulb (mL): 75 mL  Total OUT: 75 mL    Total NET: 1300 mL    LABS:                        11.3   7.87  )-----------( 339      ( 22 Jan 2022 06:21 )             34.8             01-22    140  |  106  |  6<L>  ----------------------------<  130<H>  4.1   |  32<H>  |  1.25    Ca    8.7      22 Jan 2022 06:21  Mg     2.2     01-22    TPro  7.1  /  Alb  2.6<L>  /  TBili  0.6  /  DBili  x   /  AST  224<H>  /  ALT  246<H>  /  AlkPhos  192<H>  01-22

## 2022-01-22 NOTE — CONSULT NOTE ADULT - SUBJECTIVE AND OBJECTIVE BOX
Patient is a 31y old  Male who presents with a chief complaint of RUQ pain (19 Jan 2022 10:37)     .     HPI:  HPI:  Patient is 31 years old male with no past medical history presented to ED due to right upper quadrant pain started 2 days ago. Patient stated that since Jan 1st he started to follow strict juice diet but for last couple days he started to have solid food. His pain is RUQ, 5/10, moves sometimes to left side, non radiating anywhere. Patient also was tested positive for COVID but Asymptomatic. He is Unvaccinated. Patient doesn't take any medications at home. He denied chest pain, N/V or change in bowel movement.  (18 Jan 2022 06:41)            REVIEW OF SYSTEMS  Constitutional:   No fever, no fatigue, no pallor, no night sweats, no weight loss.  HEENT:   No eye pain, no vision changes, no icterus, no mouth ulcers.  Respiratory:   No shortness of breath, no cough, no respiratory distress.   Cardiovascular:   No chest pain, no palpitations.   Gastrointestinal: + abdominal pain, no nausea, no vomiting , no diahrrea, no constipation, no hematochezia,no melena.  Skin:   No rashes, no jaundice, no eczema.   Musculoskeletal:   No joint pain, no swelling, no myalgia.   Neurologic:   No headache, no seizure, no weakness.   Genitourinary:   No dysuria, no decreased urine output.  Psychiatric:  No depression, no anxiety,   Endocrine:   No thyroid disease, no diabetes.  Heme/Lymphatic:   No anemia, no blood transfusions, no lymph node enlargement, no bleeding, no bruising.  ___________________________________________________________________________________________  Allergies    No Known Allergies    Intolerances      MEDICATIONS  (STANDING):  enoxaparin Injectable 40 milliGRAM(s) SubCutaneous daily  lactated ringers. 1000 milliLiter(s) (100 mL/Hr) IV Continuous <Continuous>  pantoprazole    Tablet 40 milliGRAM(s) Oral before breakfast    MEDICATIONS  (PRN):  acetaminophen     Tablet .. 650 milliGRAM(s) Oral every 6 hours PRN Mild Pain (1 - 3)  HYDROmorphone  Injectable 0.5 milliGRAM(s) IV Push every 6 hours PRN Severe Pain (7 - 10)  ondansetron Injectable 4 milliGRAM(s) IV Push every 6 hours PRN Nausea and/or Vomiting      PAST MEDICAL & SURGICAL HISTORY:  No pertinent past medical history    No significant past surgical history      FAMILY HISTORY:  No pertinent family history in first degree relatives      Social History: No hsitory of : Tobacco use, IVDA, EToH  ______________________________________________________________________________________    PHYSICAL EXAM    Daily     Daily   BMI: 40.6 (01-18 @ 02:22)  Change in Weight:  Vital Signs Last 24 Hrs  T(C): 37.2 (19 Jan 2022 07:30), Max: 37.3 (19 Jan 2022 00:17)  T(F): 98.9 (19 Jan 2022 07:30), Max: 99.2 (19 Jan 2022 00:17)  HR: 108 (19 Jan 2022 07:30) (88 - 110)  BP: 151/69 (19 Jan 2022 07:30) (122/84 - 151/69)  BP(mean): --  RR: 18 (19 Jan 2022 07:30) (18 - 20)  SpO2: 97% (19 Jan 2022 07:30) (97% - 99%)    General:  Well developed, well nourished, alert and active, no pallor, NAD.  HEENT:    Normal appearance of conjunctiva, ears, nose, lips, oropharynx, and oral mucosa, anicteric.  Neck:  No masses, no asymmetry.  Lymph Nodes:  No lymphadenopathy.   Cardiovascular:  RRR normal S1/S2, no murmur.  Respiratory:  CTA B/L, normal respiratory effort.   Abdominal:   soft, no masses or tenderness, normoactive BS, NT/ND, no HSM.  Extremities:   No clubbing or cyanosis, normal capillary refill, no edema.   Skin:   No rash, jaundice, lesions, eczema.   Musculoskeletal:  No joint swelling, erythema or tenderness.   Neuro: No focal deficits.   Other:   _______________________________________________________________________________________________  Lab Results:                          13.4   11.25 )-----------( 296      ( 19 Jan 2022 06:53 )             41.2     01-19    139  |  105  |  6<L>  ----------------------------<  138<H>  3.8   |  28  |  1.39<H>    Ca    9.4      19 Jan 2022 06:53  Phos  2.1     01-19  Mg     2.1     01-19    TPro  7.5  /  Alb  3.4<L>  /  TBili  0.9  /  DBili  x   /  AST  138<H>  /  ALT  201<H>  /  AlkPhos  80  01-19    LIVER FUNCTIONS - ( 19 Jan 2022 06:53 )  Alb: 3.4 g/dL / Pro: 7.5 g/dL / ALK PHOS: 80 U/L / ALT: 201 U/L DA / AST: 138 U/L / GGT: x           PT/INR - ( 18 Jan 2022 03:13 )   PT: 13.3 sec;   INR: 1.12 ratio         PTT - ( 18 Jan 2022 03:13 )  PTT:31.9 sec        Stool Results:          RADIOLOGY RESULTS:  < from: US Abdomen Upper Quadrant Right (01.17.22 @ 09:07) >    ACC: [id number] EXAM:  US ABDOMEN RT UPR QUADRANT                          PROCEDURE DATE:  01/17/2022          INTERPRETATION:  CLINICAL INFORMATION: Abdominal pain    COMPARISON: None available.    TECHNIQUE: Sonography of the right upper quadrant.    FINDINGS:    Liver: Within normal limits.  Bile ducts: Normal caliber. Common bile duct measures 7 mm.  Gallbladder: Gallbladder sludge without gallstones  Pancreas: Partially obscured  Right kidney: 11.0 cm. No hydronephrosis.  Ascites: None.  IVC: Visualized portions are within normal limits.    IMPRESSION:    Gallbladder sludge without gallstones.        --- End of Report ---            KHARI LOPEZ MD; Attending Radiologist  This document has been electronically signed. Jan 17 2022  9:38AM    < end of copied text >    SURGICAL PATHOLOGY:     
Attending:  Dr Mckinney     HPI:  31 years old Male with no Sig PMHx admitted to medical services w/ COVID infection and abdominal pain.    Surgery called to evaluate pt, pt seen and examined at bedside, admits to abdominal pain x 2 days, pain located in epigastric and RUQ area. Denies nausea or vomiting. No fever, chills, OB or CP. Pt was seen yesterday in ED, had Abd US done which showed gallbladder sludge without gallstones. Pt also tested positive for COVID, pt was dc home but returned to ED following day w/ c/o worsening abd pain.       PAST MEDICAL & SURGICAL HISTORY:  No pertinent past medical history  No significant past surgical history        MEDICATIONS  (STANDING):  enoxaparin Injectable 40 milliGRAM(s) SubCutaneous daily  lactated ringers. 1000 milliLiter(s) (100 mL/Hr) IV Continuous <Continuous>  pantoprazole    Tablet 40 milliGRAM(s) Oral before breakfast    MEDICATIONS  (PRN):  acetaminophen     Tablet .. 650 milliGRAM(s) Oral every 6 hours PRN Mild Pain (1 - 3)  HYDROmorphone  Injectable 0.5 milliGRAM(s) IV Push every 6 hours PRN Severe Pain (7 - 10)  ondansetron Injectable 4 milliGRAM(s) IV Push every 6 hours PRN Nausea and/or Vomiting      Allergies    No Known Allergies    Intolerances        SOCIAL HISTORY:  Denies smoking, no drug use, occasional ETOH use   FAMILY HISTORY:  No pertinent family history in first degree relatives        Vital Signs Last 24 Hrs  T(C): 37.1 (2022 12:22), Max: 37.2 (2022 02:22)  T(F): 98.8 (2022 12:22), Max: 98.9 (2022 02:22)  HR: 88 (2022 12:22) (72 - 88)  BP: 131/88 (2022 12:22) (113/69 - 150/82)  BP(mean): --  RR: 19 (2022 12:22) (16 - 20)  SpO2: 97% (2022 12:22) (97% - 99%)    I&O's Summary      LABS:                        14.0   9.42  )-----------( 344      ( 2022 03:13 )             43.4     01-18    139  |  105  |  6<L>  ----------------------------<  114<H>  3.7   |  27  |  1.36<H>    Ca    8.6      2022 03:13    TPro  7.9  /  Alb  3.6  /  TBili  1.1  /  DBili  x   /  AST  74<H>  /  ALT  121<H>  /  AlkPhos  49      PT/INR - ( 2022 03:13 )   PT: 13.3 sec;   INR: 1.12 ratio         PTT - ( 2022 03:13 )  PTT:31.9 sec  Urinalysis Basic - ( 2022 04:01 )    Color: Yellow / Appearance: Clear / S.025 / pH: x  Gluc: x / Ketone: Trace  / Bili: Negative / Urobili: 4   Blood: x / Protein: 30 mg/dL / Nitrite: Negative   Leuk Esterase: Negative / RBC: 2-5 /HPF / WBC 0-2 /HPF   Sq Epi: x / Non Sq Epi: Few /HPF / Bacteria: Few /HPF      CAPILLARY BLOOD GLUCOSE    LIVER FUNCTIONS - ( 2022 03:13 )  Alb: 3.6 g/dL / Pro: 7.9 g/dL / ALK PHOS: 49 U/L / ALT: 121 U/L DA / AST: 74 U/L / GGT: x             Cultures:  Culture Results:   <10,000 CFU/mL Normal Urogenital Mago ( @ 10:42)      RADIOLOGY & ADDITIONAL STUDIES:  < from: CT Abdomen and Pelvis w/ IV Cont (22 @ 05:28) >  FINDINGS:  LOWER CHEST: Within normal limits.    LIVER: Within normal limits.  BILE DUCTS: Mild left intrahepatic ductal dilatation. CBD normal caliber.  GALLBLADDER: No radiopaque stones. Question mild gallbladder wall   thickening. No pericholecystic inflammation.  SPLEEN: Within normal limits.  PANCREAS: Within normal limits.  ADRENALS: Within normal limits.  KIDNEYS/URETERS: Within normal limits.    BLADDER: Within normal limits.  REPRODUCTIVE ORGANS: Normal-sized prostate.    BOWEL: No bowel obstruction. Appendix is normal. Submucosal fat   deposition along the right colon likely reflects sequela of prior   inflammation.  PERITONEUM: No ascites.  VESSELS: Within normal limits.  RETROPERITONEUM/LYMPH NODES: No lymphadenopathy.  ABDOMINAL WALL: Tiny fat-containing umbilical hernia.  BONES: Minimal degenerative changes.    IMPRESSION:  No pericholecystic inflammation. Question mild gallbladder wall   thickening.  Correlation with ultrasound is recommended.    Mild left intrahepatic ductal dilatation. Normal caliber CBD. Findings   are of uncertain etiology. Nonurgent follow-up MRI/MRCP is recommended   for further evaluation.      --- End of Report ---    < end of copied text >    < from: US Abdomen Upper Quadrant Right (22 @ 09:07) >  FINDINGS:    Liver: Within normal limits.  Bile ducts: Normal caliber. Common bile duct measures 7 mm.  Gallbladder: Gallbladder sludge without gallstones  Pancreas: Partially obscured  Right kidney: 11.0 cm. No hydronephrosis.  Ascites: None.  IVC: Visualized portions are within normal limits.    IMPRESSION:    Gallbladder sludge without gallstones.        --- End of Report ---    < end of copied text >  
Source of information: , Chart review, patient  Patient language: English  : n/a    CC: Patient is a 31y old  Male who presents with a chief complaint of RUQ pain (22 Jan 2022 16:19)      HPI:  Patient is 31 years old male with no past medical history presented to ED due to right upper quadrant pain started 2 days ago. Patient stated that since Jan 1st he started to follow strict juice diet but for last couple days he started to have solid food. His pain is RUQ, 5/10, moves sometimes to left side, non radiating anywhere. Patient also was tested positive for COVID but Asymptomatic. He is Unvaccinated. Patient doesn't take any medications at home. He denied chest pain, N/V or change in bowel movement.  (18 Jan 2022 06:41)    Pt s/p lap choley pod #1.  + abdominal pain.  Pain worsens with cough. No nausea or vomiting.  Pt is covid + but not symptomatic.  + sore throat after intubation.      PAIN SCORE:   4/10    SCALE USED: (1-10 VNRS)    PAST MEDICAL & SURGICAL HISTORY:  No pertinent past medical history    No significant past surgical history    FAMILY HISTORY:  No pertinent family history in first degree relatives    SOCIAL HISTORY:  x Denies Smoking, Alcohol, or Drug Use    Allergies    No Known Allergies    Intolerances        MEDICATIONS:    MEDICATIONS  (STANDING):  acetaminophen   IVPB .. 1000 milliGRAM(s) IV Intermittent once  benzocaine 15 mG/menthol 3.6 mG Lozenge 1 Lozenge Oral every 8 hours  chlorhexidine 2% Cloths 1 Application(s) Topical two times a day  ciprofloxacin   IVPB      ciprofloxacin   IVPB 400 milliGRAM(s) IV Intermittent every 12 hours  guaiFENesin Oral Liquid (Sugar-Free) 200 milliGRAM(s) Oral every 6 hours  metroNIDAZOLE  IVPB      metroNIDAZOLE  IVPB 500 milliGRAM(s) IV Intermittent every 8 hours  pantoprazole    Tablet 40 milliGRAM(s) Oral before breakfast  senna 2 Tablet(s) Oral at bedtime    MEDICATIONS  (PRN):  HYDROmorphone   Tablet 2 milliGRAM(s) Oral every 4 hours PRN Severe Pain (7 - 10)  ondansetron Injectable 4 milliGRAM(s) IV Push every 6 hours PRN Nausea and/or Vomiting  polyethylene glycol 3350 17 Gram(s) Oral daily PRN Constipation  saline laxative (FLEET) Rectal Enema 1 Enema Rectal daily PRN If Miralax not therapeutic      Vital Signs Last 24 Hrs  T(C): 36.6 (23 Jan 2022 06:06), Max: 37.3 (22 Jan 2022 13:07)  T(F): 97.9 (23 Jan 2022 06:06), Max: 99.1 (22 Jan 2022 13:07)  HR: 74 (23 Jan 2022 06:06) (74 - 81)  BP: 138/82 (23 Jan 2022 06:06) (135/87 - 158/88)  BP(mean): --  RR: 17 (23 Jan 2022 06:06) (17 - 18)  SpO2: 96% (23 Jan 2022 06:06) (96% - 97%)    LABS:                          11.4   7.15  )-----------( 372      ( 23 Jan 2022 06:48 )             35.4     01-23    141  |  106  |  8   ----------------------------<  98  4.0   |  31  |  1.23    Ca    9.1      23 Jan 2022 06:48  Mg     2.1     01-23    TPro  7.0  /  Alb  2.7<L>  /  TBili  0.5  /  DBili  x   /  AST  117<H>  /  ALT  207<H>  /  AlkPhos  184<H>  01-23      LIVER FUNCTIONS - ( 23 Jan 2022 06:48 )  Alb: 2.7 g/dL / Pro: 7.0 g/dL / ALK PHOS: 184 U/L / ALT: 207 U/L DA / AST: 117 U/L / GGT: x             CAPILLARY BLOOD GLUCOSE          REVIEW OF SYSTEMS:  CONSTITUTIONAL: No fever or fatigue  RESPIRATORY: No cough, wheezing, chills or hemoptysis; No shortness of breath  CARDIOVASCULAR: No chest pain, palpitations, dizziness, or leg swelling  GASTROINTESTINAL: + abdominal pain    GENITOURINARY: No dysuria, frequency, hematuria, retention or incontinence  MUSCULOSKELETAL: No joint pain or swelling; No muscle, back, or extremity pain, no upper or lower motor strength weakness, no saddle anesthesia, bowel/bladder incontinence, no falls   NEURO: No weakness, no numbness   PSYCHIATRIC: No depression, anxiety, mood swings, or difficulty sleeping    PHYSICAL EXAM:  GENERAL:  Alert & Oriented X3, NAD, Good concentration + sore throat   CHEST/LUNG: Clear to auscultation bilaterally; No rales, rhonchi, wheezing, or rubs  HEART: Regular rate and rhythm; No murmurs, rubs, or gallops  ABDOMEN: distended, + tenderness   : no incontinence, no flank pain  EXTREMITIES:  2+ Peripheral Pulses, No cyanosis, or edema  MUSCULOSKELETAL: Motor Strength 5/5 B/L upper and lower extremities; moves all extremities equally against gravity; ROM intact; negative SRL  NEUROLOGICAL: awake and alert and oriented   SKIN: No rashes or lesions    Radiology:  < from: Xray Chest 2 Views PA/Lat (01.18.22 @ 02:51) >    ACC: [id number] EXAM:  XR CHEST PA LAT 2V                          PROCEDURE DATE:  01/18/2022          INTERPRETATION:  PA and lateral chest radiographs    COMPARISON: 1/18/2020 chest.    CLINICAL INFORMATION: Assess for pneumonia.    FINDINGS:    PULMONARY: The airway is midline.  There are no airspace consolidations.  No pleural effusion or pneumothorax.    HEART/VASCULAR: The heart size and mediastinum configuration are within   the limits of normal.    BONES: The visualized osseus structuresare intact.    IMPRESSION:    No acute radiographic cardiopulmonary pathology.    < end of copied text >      Drug Screen:    ciprofloxacin   IVPB      ciprofloxacin   IVPB 400 milliGRAM(s) IV Intermittent every 12 hours  metroNIDAZOLE  IVPB      metroNIDAZOLE  IVPB 500 milliGRAM(s) IV Intermittent every 8 hours        ORT Score   Family Hx of substance abuse	Female	Male  Alcohol 	                                              1              3  Illegal drugs	                                      2              3  Rx drugs                                               4	      4    Personal Hx of substance abuse		  Alcohol 	                                               3	      3  Illegal drugs                                  	       4	      4  Rx drugs                                                5	      5  Age between 16- 45 years	               1             1  hx preadolescent sexual abuse	       3	      0  Psychological disease		  ADD, OCD, bipolar, schizophrenia	2	      2  Depression                                    	1	      1  Score totals 		0   		  a score of 3 or lower indicates low risk for opioid abuse		  a score of 4-7 indicates moderate risk for opioid abuse		  a score of 8 or higher indicates high risk for opioid abuse	    Risk factors associated with adverse outcomes related to opioid treatment  [ ]  Concurrent benzodiazepine use  [ ]  History/ Active substance use or alcohol use disorder  [ ] Psychiatric co-morbidity  [ ] Sleep apnea  [ ] COPD  [ ] BMI> 35  [ ] Liver dysfunction  [ ] Renal dysfunction  [ ] CHF  [ ] Smoker  [x ]  Age > 60 years      [ x]  [place]  Reviewed and Copied to Chart. See below.

## 2022-01-22 NOTE — CONSULT NOTE ADULT - ASSESSMENT
31 year old male with abdominal pain . ? biliary colic      Elevated lFts   Normal bili   no signs of obstruction    No Gi intervetnion at this time   Proceed with GB removal as per surgical team 
Prescription Monitoring Program Registry  Welcome Brittny Lee  ×  Update Personal Info  FAQ  Search Results Help  Help  ×An MME Calculator is now available by clicking the MME tab on the purple task bar.  An informational sheet detailing this enhancement may be found by clicking ‘MME Calculator Help’ on the MME Calculator page.   This informational tool is a resource and is not meant for direct clinical application.   Patient Search   Multi-Patient Search   MME Calculator   Reports   Drug List   Designation   My MARCUS #  Data Detail Level: Printer-Friendly View Extended View  Confidential Drug Utilization Report  Search Terms: ángel dave, 1990Search Date: 01/23/2022 10:17:28 AM  The Drug Utilization Report below displays all of the controlled substance prescriptions, if any, that your patient has filled in the last twelve months. The information displayed on this report is compiled from pharmacy submissions to the Department, and accurately reflects the information as submitted by the pharmacies.    This report was requested by: Brittny Lee | Reference #: [id number]    There are no results for the search terms that you entered.     
32 y/o Male w/ symptomatic cholelithiasis, COVID positive     -CT abd/pelvis, abd US images reviewed   -Pt to benefit from cholecystectomy, inpatient vs outpatient   -Pt reluctant to surgical intervention, would like to d/w family members   -Pt currently NPO, may give clear diet    -IVF   -IV abx   -Pain medication PRN   -Supportive care for COVID infection   -D/w Dr Mckinney and agrees

## 2022-01-22 NOTE — PROGRESS NOTE ADULT - ASSESSMENT
31M with symptomatic cholelithiasis s/p laparoscopic cholecystectomy POD#1  COVID positive    -advance diet as tolerated  -pt stable for discharge from surgical perspective with SHARON  -pt to follow up with Dr. Mckinney as an outpatient for SHARON removal  -pain control PRN   -dvt prophylaxis  31M with symptomatic cholelithiasis s/p laparoscopic cholecystectomy POD#1  COVID positive    -advance diet as tolerated  -pt stable for discharge from surgical perspective with SHARON and cipro/flagyl x 7 days  -pt to follow up with Dr. Mckinney as an outpatient for SHARON removal  -pain control PRN   -dvt prophylaxis

## 2022-01-22 NOTE — PROGRESS NOTE ADULT - SUBJECTIVE AND OBJECTIVE BOX
Patient is a 31y old  Male who presents with a chief complaint of RUQ pain (22 Jan 2022 10:24)    Patient was seen and examined at bedside   Complains of sore throat and cough   Complains of abdominal pain while coughing otherwise denies any complains   Able to tolerate liquid diet     INTERVAL HPI/OVERNIGHT EVENTS:  T(C): 37.3 (01-22-22 @ 13:07), Max: 37.3 (01-22-22 @ 13:07)  HR: 81 (01-22-22 @ 13:07) (80 - 102)  BP: 135/87 (01-22-22 @ 13:07) (129/93 - 138/86)  RR: 18 (01-22-22 @ 13:07) (16 - 20)  SpO2: 97% (01-22-22 @ 13:07) (96% - 98%)  Wt(kg): --  I&O's Summary    21 Jan 2022 07:01  -  22 Jan 2022 07:00  --------------------------------------------------------  IN: 1375 mL / OUT: 75 mL / NET: 1300 mL        REVIEW OF SYSTEMS: denies fever, chills, SOB, palpitations, chest pain, nausea, vomiting, diarrhea, constipation, dizziness    MEDICATIONS  (STANDING):  acetaminophen   IVPB .. 1000 milliGRAM(s) IV Intermittent once  benzocaine 15 mG/menthol 3.6 mG Lozenge 1 Lozenge Oral every 8 hours  chlorhexidine 2% Cloths 1 Application(s) Topical two times a day  ciprofloxacin   IVPB      ciprofloxacin   IVPB 400 milliGRAM(s) IV Intermittent every 12 hours  guaiFENesin Oral Liquid (Sugar-Free) 200 milliGRAM(s) Oral every 6 hours  metroNIDAZOLE  IVPB      metroNIDAZOLE  IVPB 500 milliGRAM(s) IV Intermittent every 8 hours  pantoprazole    Tablet 40 milliGRAM(s) Oral before breakfast    MEDICATIONS  (PRN):  HYDROmorphone   Tablet 2 milliGRAM(s) Oral every 4 hours PRN Severe Pain (7 - 10)  ondansetron Injectable 4 milliGRAM(s) IV Push every 6 hours PRN Nausea and/or Vomiting      PHYSICAL EXAM:  GENERAL: NAD, obese  HEAD:  Atraumatic, Normocephalic  EYES: EOMI, PERRLA, conjunctiva and sclera clear  NERVOUS SYSTEM:  Alert & Oriented X3, Good concentration; Motor Strength 5/5 B/L upper and lower extremities; DTRs 2+ intact and symmetric  CHEST/LUNG: Clear to percussion bilaterally; No rales, rhonchi, wheezing, or rubs  HEART: Regular rate and rhythm; No murmurs, rubs, or gallops  ABDOMEN: Soft, Nontender, Nondistended; Bowel sounds present  EXTREMITIES:  2+ Peripheral Pulses, No clubbing, cyanosis, or edema  LYMPH: No lymphadenopathy noted  SKIN: No rashes or lesions  LABS:                        11.3   7.87  )-----------( 339      ( 22 Jan 2022 06:21 )             34.8     01-22    140  |  106  |  6<L>  ----------------------------<  130<H>  4.1   |  32<H>  |  1.25    Ca    8.7      22 Jan 2022 06:21  Mg     2.2     01-22    TPro  7.1  /  Alb  2.6<L>  /  TBili  0.6  /  DBili  x   /  AST  224<H>  /  ALT  246<H>  /  AlkPhos  192<H>  01-22    PT/INR - ( 21 Jan 2022 06:10 )   PT: 14.2 sec;   INR: 1.20 ratio             CAPILLARY BLOOD GLUCOSE             Patient is a 31y old  Male who presents with a chief complaint of RUQ pain (22 Jan 2022 10:24)    Patient was seen and examined at bedside   Complains of sore throat and cough   Complains of abdominal pain while coughing otherwise denies any complains   Able to tolerate liquid diet     INTERVAL HPI/OVERNIGHT EVENTS:  T(C): 37.3 (01-22-22 @ 13:07), Max: 37.3 (01-22-22 @ 13:07)  HR: 81 (01-22-22 @ 13:07) (80 - 102)  BP: 135/87 (01-22-22 @ 13:07) (129/93 - 138/86)  RR: 18 (01-22-22 @ 13:07) (16 - 20)  SpO2: 97% (01-22-22 @ 13:07) (96% - 98%)  Wt(kg): --  I&O's Summary    21 Jan 2022 07:01  -  22 Jan 2022 07:00  --------------------------------------------------------  IN: 1375 mL / OUT: 75 mL / NET: 1300 mL        REVIEW OF SYSTEMS: denies fever, chills, SOB, palpitations, chest pain, nausea, vomiting, diarrhea, constipation, dizziness    MEDICATIONS  (STANDING):  acetaminophen   IVPB .. 1000 milliGRAM(s) IV Intermittent once  benzocaine 15 mG/menthol 3.6 mG Lozenge 1 Lozenge Oral every 8 hours  chlorhexidine 2% Cloths 1 Application(s) Topical two times a day  ciprofloxacin   IVPB      ciprofloxacin   IVPB 400 milliGRAM(s) IV Intermittent every 12 hours  guaiFENesin Oral Liquid (Sugar-Free) 200 milliGRAM(s) Oral every 6 hours  metroNIDAZOLE  IVPB      metroNIDAZOLE  IVPB 500 milliGRAM(s) IV Intermittent every 8 hours  pantoprazole    Tablet 40 milliGRAM(s) Oral before breakfast    MEDICATIONS  (PRN):  HYDROmorphone   Tablet 2 milliGRAM(s) Oral every 4 hours PRN Severe Pain (7 - 10)  ondansetron Injectable 4 milliGRAM(s) IV Push every 6 hours PRN Nausea and/or Vomiting      PHYSICAL EXAM:  GENERAL: NAD, obese  HEAD:  Atraumatic, Normocephalic  EYES: EOMI, PERRLA, conjunctiva and sclera clear  No tonsillar swelling, no pharyngeal edema   NERVOUS SYSTEM:  Alert & Oriented X3, Good concentration; Motor Strength 5/5 B/L upper and lower extremities  CHEST/LUNG: Clear to ausculation bilaterally; No rales, rhonchi, wheezing, or rubs  HEART: Regular rate and rhythm; No murmurs, rubs, or gallops  ABDOMEN: Soft, tender at surgical site, SHARON drain has serosanguinous collection, Bowel sounds present  EXTREMITIES:  2+ Peripheral Pulses, No clubbing, cyanosis, or edema  SKIN: No rashes or lesions    LABS:                        11.3   7.87  )-----------( 339      ( 22 Jan 2022 06:21 )             34.8     01-22    140  |  106  |  6<L>  ----------------------------<  130<H>  4.1   |  32<H>  |  1.25    Ca    8.7      22 Jan 2022 06:21  Mg     2.2     01-22    TPro  7.1  /  Alb  2.6<L>  /  TBili  0.6  /  DBili  x   /  AST  224<H>  /  ALT  246<H>  /  AlkPhos  192<H>  01-22    PT/INR - ( 21 Jan 2022 06:10 )   PT: 14.2 sec;   INR: 1.20 ratio             CAPILLARY BLOOD GLUCOSE

## 2022-01-23 DIAGNOSIS — G89.18 OTHER ACUTE POSTPROCEDURAL PAIN: ICD-10-CM

## 2022-01-23 LAB
ALBUMIN SERPL ELPH-MCNC: 2.7 G/DL — LOW (ref 3.5–5)
ALP SERPL-CCNC: 184 U/L — HIGH (ref 40–120)
ALT FLD-CCNC: 207 U/L DA — HIGH (ref 10–60)
ANION GAP SERPL CALC-SCNC: 4 MMOL/L — LOW (ref 5–17)
AST SERPL-CCNC: 117 U/L — HIGH (ref 10–40)
BILIRUB SERPL-MCNC: 0.5 MG/DL — SIGNIFICANT CHANGE UP (ref 0.2–1.2)
BUN SERPL-MCNC: 8 MG/DL — SIGNIFICANT CHANGE UP (ref 7–18)
CALCIUM SERPL-MCNC: 9.1 MG/DL — SIGNIFICANT CHANGE UP (ref 8.4–10.5)
CHLORIDE SERPL-SCNC: 106 MMOL/L — SIGNIFICANT CHANGE UP (ref 96–108)
CO2 SERPL-SCNC: 31 MMOL/L — SIGNIFICANT CHANGE UP (ref 22–31)
CREAT SERPL-MCNC: 1.23 MG/DL — SIGNIFICANT CHANGE UP (ref 0.5–1.3)
GLUCOSE SERPL-MCNC: 98 MG/DL — SIGNIFICANT CHANGE UP (ref 70–99)
HCT VFR BLD CALC: 35.4 % — LOW (ref 39–50)
HGB BLD-MCNC: 11.4 G/DL — LOW (ref 13–17)
MAGNESIUM SERPL-MCNC: 2.1 MG/DL — SIGNIFICANT CHANGE UP (ref 1.6–2.6)
MCHC RBC-ENTMCNC: 28 PG — SIGNIFICANT CHANGE UP (ref 27–34)
MCHC RBC-ENTMCNC: 32.2 GM/DL — SIGNIFICANT CHANGE UP (ref 32–36)
MCV RBC AUTO: 87 FL — SIGNIFICANT CHANGE UP (ref 80–100)
NRBC # BLD: 0 /100 WBCS — SIGNIFICANT CHANGE UP (ref 0–0)
PLATELET # BLD AUTO: 372 K/UL — SIGNIFICANT CHANGE UP (ref 150–400)
POTASSIUM SERPL-MCNC: 4 MMOL/L — SIGNIFICANT CHANGE UP (ref 3.5–5.3)
POTASSIUM SERPL-SCNC: 4 MMOL/L — SIGNIFICANT CHANGE UP (ref 3.5–5.3)
PROT SERPL-MCNC: 7 G/DL — SIGNIFICANT CHANGE UP (ref 6–8.3)
RBC # BLD: 4.07 M/UL — LOW (ref 4.2–5.8)
RBC # FLD: 13.5 % — SIGNIFICANT CHANGE UP (ref 10.3–14.5)
SODIUM SERPL-SCNC: 141 MMOL/L — SIGNIFICANT CHANGE UP (ref 135–145)
WBC # BLD: 7.15 K/UL — SIGNIFICANT CHANGE UP (ref 3.8–10.5)
WBC # FLD AUTO: 7.15 K/UL — SIGNIFICANT CHANGE UP (ref 3.8–10.5)

## 2022-01-23 PROCEDURE — 99233 SBSQ HOSP IP/OBS HIGH 50: CPT

## 2022-01-23 PROCEDURE — 99222 1ST HOSP IP/OBS MODERATE 55: CPT

## 2022-01-23 RX ORDER — MOXIFLOXACIN HYDROCHLORIDE TABLETS, 400 MG 400 MG/1
1 TABLET, FILM COATED ORAL
Qty: 10 | Refills: 0
Start: 2022-01-23 | End: 2022-01-27

## 2022-01-23 RX ORDER — TRAMADOL HYDROCHLORIDE 50 MG/1
1 TABLET ORAL
Qty: 12 | Refills: 0
Start: 2022-01-23 | End: 2022-01-25

## 2022-01-23 RX ORDER — ACETAMINOPHEN 500 MG
1000 TABLET ORAL ONCE
Refills: 0 | Status: COMPLETED | OUTPATIENT
Start: 2022-01-23 | End: 2022-01-23

## 2022-01-23 RX ORDER — SENNA PLUS 8.6 MG/1
2 TABLET ORAL
Qty: 0 | Refills: 0 | DISCHARGE
Start: 2022-01-23

## 2022-01-23 RX ORDER — METRONIDAZOLE 500 MG
1 TABLET ORAL
Qty: 15 | Refills: 0
Start: 2022-01-23 | End: 2022-01-27

## 2022-01-23 RX ORDER — POLYETHYLENE GLYCOL 3350 17 G/17G
17 POWDER, FOR SOLUTION ORAL
Qty: 0 | Refills: 0 | DISCHARGE
Start: 2022-01-23

## 2022-01-23 RX ADMIN — Medication 200 MILLIGRAM(S): at 06:23

## 2022-01-23 RX ADMIN — CHLORHEXIDINE GLUCONATE 1 APPLICATION(S): 213 SOLUTION TOPICAL at 06:21

## 2022-01-23 RX ADMIN — BENZOCAINE AND MENTHOL 1 LOZENGE: 5; 1 LIQUID ORAL at 06:21

## 2022-01-23 RX ADMIN — Medication 200 MILLIGRAM(S): at 17:41

## 2022-01-23 RX ADMIN — Medication 100 MILLIGRAM(S): at 21:13

## 2022-01-23 RX ADMIN — Medication 200 MILLIGRAM(S): at 01:57

## 2022-01-23 RX ADMIN — Medication 200 MILLIGRAM(S): at 11:34

## 2022-01-23 RX ADMIN — BENZOCAINE AND MENTHOL 1 LOZENGE: 5; 1 LIQUID ORAL at 21:13

## 2022-01-23 RX ADMIN — CHLORHEXIDINE GLUCONATE 1 APPLICATION(S): 213 SOLUTION TOPICAL at 17:40

## 2022-01-23 RX ADMIN — Medication 100 MILLIGRAM(S): at 06:26

## 2022-01-23 RX ADMIN — BENZOCAINE AND MENTHOL 1 LOZENGE: 5; 1 LIQUID ORAL at 13:30

## 2022-01-23 RX ADMIN — HYDROMORPHONE HYDROCHLORIDE 2 MILLIGRAM(S): 2 INJECTION INTRAMUSCULAR; INTRAVENOUS; SUBCUTANEOUS at 01:57

## 2022-01-23 RX ADMIN — Medication 100 MILLIGRAM(S): at 13:33

## 2022-01-23 RX ADMIN — Medication 400 MILLIGRAM(S): at 11:34

## 2022-01-23 RX ADMIN — PANTOPRAZOLE SODIUM 40 MILLIGRAM(S): 20 TABLET, DELAYED RELEASE ORAL at 06:24

## 2022-01-23 RX ADMIN — SENNA PLUS 2 TABLET(S): 8.6 TABLET ORAL at 21:13

## 2022-01-23 RX ADMIN — Medication 1000 MILLIGRAM(S): at 12:30

## 2022-01-23 NOTE — DISCHARGE NOTE PROVIDER - CARE PROVIDERS DIRECT ADDRESSES
[email or web address]

## 2022-01-23 NOTE — DISCHARGE NOTE PROVIDER - PROVIDER TOKENS
PROVIDER:[TOKEN:[[id number]],SCHEDULEDAPPT:[01/28/2022],SCHEDULEDAPPTTIME:[09:00 AM]],PROVIDER:[TOKEN:[[id number]],FOLLOWUP:[1 week]]

## 2022-01-23 NOTE — PROGRESS NOTE ADULT - ASSESSMENT
31M with symptomatic cholelithiasis s/p laparoscopic cholecystectomy POD#2  COVID positive    -Reg diet as tolerated  -Stable for discharge from surgical perspective with SHARON and cipro/flagyl x 7 days  -Pt to follow up with Dr. Mckinney as an outpatient for SHARON removal  -Pain control PRN   -DVT ppx  -Discussed with attending

## 2022-01-23 NOTE — DISCHARGE NOTE PROVIDER - NSDCFUADDAPPT_GEN_ALL_CORE_FT
Arline Rodriguez  [phone] [place] [id number] [phone]    WALK-IN for registration and appointment  SLIDING SCALE payment    Hours:  Monday-Thursday:  8am – 7pm  Friday & Saturday: 8am – 5pm    Please bring:  •	Passport/ID   •	Pay Stubs or letter of financial support   •	Proof of address   •	Discharge papers

## 2022-01-23 NOTE — DISCHARGE NOTE PROVIDER - NSDCCPCAREPLAN_GEN_ALL_CORE_FT
PRINCIPAL DISCHARGE DIAGNOSIS  Diagnosis: Acute cholecystitis  Assessment and Plan of Treatment: You presented to the hospital with abdominal pain, your were found to have acute cholecystitis - also known as inflammation of your gallbladder. Your gallbladder stores bile, which helps break down the fat that you eat. Your gallbladder also helps remove certain chemicals from your body. You may have a sudden, severe attack (acute cholecystitis). You had it surgically removed 1/21/22, you still have a SHARON drain in which is draining fluid from the surgical site. You have a follow up appointment scheduled with Dr Mckinney on Thursday 1/28. Call your doctor or surgeon if:  You have pain or nausea that is not relieved by medicine.  You have redness and swelling around your incision sites.  You have blood or pus leaking from your incision sites.  You are constipated, have diarrhea, or your bowel movements are pale.  Your skin or eyes are yellow.  You have questions or concerns about your surgery, condition, or care.  Medicines: You may need any of the following:  Prescription pain medicine may be given. Ask your healthcare provider how to take this medicine safely. Some prescription pain medicines contain acetaminophen. Do not take other medicines that contain acetaminophen without talking to your healthcare provider. Too much acetaminophen may cause liver damage. Prescription pain medicine may cause constipation. Ask your healthcare provider how to prevent or treat constipation.  NSAIDs help decrease swelling and pain or fever. This medicine is available with or without a doctor's order. NSAIDs can cause stomach bleeding or kidney problems in certain people. If you take blood thinner medicine, always ask your healthcare provider if NSAIDs are safe for you. Always read the medicine label and follow directions.  Take your medicine as directed. Contact your healthcare provider if you think your medicine is not helping or if you have side effects. Tell him or her if you are allergic to any medicine. Keep a list of the medicines, vitamins, and herbs you take. Include the amounts, and      SECONDARY DISCHARGE DIAGNOSES  Diagnosis: 2019 novel coronavirus disease (COVID-19)  Assessment and Plan of Treatment: You were incidentally found to have COVID 19, you remained asymptomatic and require no further treatment or work up.  CORONAVIRUS INSTRUCTIONS:   Based on your current clinical status and stability, it has been determined that you no longer need hospitalization and can recover while remaining in self-quarantine at home. You should follow the prevention steps below until a healthcare provider or local or state health department says you can return to your normal activities.   1. You should restrict activities outside your home, except for getting medical care.   2. Do not go to work, school, or public areas.   3. Avoid using public transportation, ride-sharing, or taxis.   4. Separate yourself from other people and animals in your home as much as possible.  When you are around other people (e.g., sharing a room or vehicle) you should wear a facemask.  5. Wash your hands often with soap and water for at least 20 seconds, especially after blowing your nose, coughing, or sneezing; going to the bathroom; and before eating or preparing food.  6. Cover your mouth and nose with a tissue when you cough or sneeze. Throw used tissues in a lined trash can. Immediately wash your hands with soap and water for at least 20 seconds  7. High touch surfaces include counters, tabletops, doorknobs, bathroom fixtures, toilets, phones, keyboards, tablets, and bedside tables.  8. Avoid sharing dishes, drinking glasses, cups, eating utensils, towels, or bedding with other people or pets in your home. After using these items, they should be washed thoroughly with soap and water.  You are strongly advised to seek prompt medical attention if your illness worsens or you develop new symptoms like fever or difficulty breathing.   Please call  [phone] to speak with your discharging physician if you have any questions.      Diagnosis: THEA (acute kidney injury)  Assessment and Plan of Treatment: You have been diagnosed with acute kidney injury. This means that your kidneys are not working properly. When both kidneys are healthy, they help filter out fluid and waste from the blood and body. Acute kidney injury has many causes. These include urinary blockages, infection, lack of enough blood supply, and medicines that can injure kidneys. In some cases, acute kidney injury is short-term (temporary), lasting several days to a few months. This is because the kidney can repair itself. But acute kidney injury can also result in chronic kidney disease or end stage renal failure.   It is important that you stay hydrated, and keep a record of how much you urinate. Avoid kidney toxic medications like NSAIDs (Motrin ibuprofen and IV contrast).     PRINCIPAL DISCHARGE DIAGNOSIS  Diagnosis: Acute cholecystitis  Assessment and Plan of Treatment: You presented to the hospital with abdominal pain, your were found to have acute cholecystitis - also known as inflammation of your gallbladder. Your gallbladder stores bile, which helps break down the fat that you eat. You had it surgically removed 1/21/22, per operatively your GB was found to be gangrenous, you still have a SHARON drain in which is draining fluid from the surgical site. You have a follow up appointment scheduled with Dr Mckinney on Thursday 1/28. Call your doctor or surgeon if:  You have pain or nausea that is not relieved by medicine.  You have redness and swelling around your incision sites.  You have blood or pus leaking from your incision sites.  You are constipated, have diarrhea, or your bowel movements are pale.  Your skin or eyes are yellow.  You have questions or concerns about your surgery, condition, or care.  Medicines: You may need any of the following:  Prescription pain medicine may be given. Ask your healthcare provider how to take this medicine safely. Some prescription pain medicines contain acetaminophen. Do not take other medicines that contain acetaminophen without talking to your healthcare provider. Too much acetaminophen may cause liver damage. Prescription pain medicine may cause constipation. Ask your healthcare provider how to prevent or treat constipation.  NSAIDs help decrease swelling and pain or fever. This medicine is available with or without a doctor's order. NSAIDs can cause stomach bleeding or kidney problems in certain people. If you take blood thinner medicine, always ask your healthcare provider if NSAIDs are safe for you. Always read the medicine label and follow directions.  Take your medicine as directed. Contact your healthcare provider if you think your medicine is not helping or if you have side effects. Tell him or her if you are allergic to any medicine. Keep a list of the medicines, vitamins, and herbs you take. Include the amounts, and      SECONDARY DISCHARGE DIAGNOSES  Diagnosis: THEA (acute kidney injury)  Assessment and Plan of Treatment: You have been diagnosed with acute kidney injury. This means that your kidneys are not working properly. When both kidneys are healthy, they help filter out fluid and waste from the blood and body. Acute kidney injury has many causes. These include urinary blockages, infection, lack of enough blood supply, and medicines that can injure kidneys. In some cases, acute kidney injury is short-term (temporary), lasting several days to a few months. This is because the kidney can repair itself. But acute kidney injury can also result in chronic kidney disease or end stage renal failure.   It is important that you stay hydrated, and keep a record of how much you urinate. Avoid kidney toxic medications like NSAIDs (Motrin ibuprofen and IV contrast).    Diagnosis: Transaminitis  Assessment and Plan of Treatment: Your liver enzymes are elevated, please follow up with your PCP and surgeon for repeat blood test.    Diagnosis: 2019 novel coronavirus disease (COVID-19)  Assessment and Plan of Treatment: You were incidentally found to have COVID 19, you remained asymptomatic and require no further treatment or work up.  CORONAVIRUS INSTRUCTIONS:   Based on your current clinical status and stability, it has been determined that you no longer need hospitalization and can recover while remaining in self-quarantine at home. You should follow the prevention steps below until a healthcare provider or local or state health department says you can return to your normal activities.   1. You should restrict activities outside your home, except for getting medical care.   2. Do not go to work, school, or public areas.   3. Avoid using public transportation, ride-sharing, or taxis.   4. Separate yourself from other people and animals in your home as much as possible.  When you are around other people (e.g., sharing a room or vehicle) you should wear a facemask.  5. Wash your hands often with soap and water for at least 20 seconds, especially after blowing your nose, coughing, or sneezing; going to the bathroom; and before eating or preparing food.  6. Cover your mouth and nose with a tissue when you cough or sneeze. Throw used tissues in a lined trash can. Immediately wash your hands with soap and water for at least 20 seconds  7. High touch surfaces include counters, tabletops, doorknobs, bathroom fixtures, toilets, phones, keyboards, tablets, and bedside tables.  8. Avoid sharing dishes, drinking glasses, cups, eating utensils, towels, or bedding with other people or pets in your home. After using these items, they should be washed thoroughly with soap and water.  You are strongly advised to seek prompt medical attention if your illness worsens or you develop new symptoms like fever or difficulty breathing.   Please call   to speak with your discharging physician if you have any questions.

## 2022-01-23 NOTE — DISCHARGE NOTE PROVIDER - NSDCMRMEDTOKEN_GEN_ALL_CORE_FT
Cipro 500 mg oral tablet: 1 tab(s) orally 2 times a day   metroNIDAZOLE 500 mg oral tablet: 1 tab(s) orally every 8 hours   polyethylene glycol 3350 oral powder for reconstitution: 17 gram(s) orally once a day, As needed, Constipation  senna oral tablet: 2 tab(s) orally once a day (at bedtime)  traMADol 50 mg oral tablet: 1 tab(s) orally every 6 hours -for moderate pain MDD:4 tabs   Cipro 500 mg oral tablet: 1 tab(s) orally 2 times a day   metroNIDAZOLE 500 mg oral tablet: 1 tab(s) orally every 8 hours   polyethylene glycol 3350 oral powder for reconstitution: 17 gram(s) orally once a day, As needed, Constipation  senna oral tablet: 2 tab(s) orally once a day (at bedtime)  traMADol 50 mg oral tablet: 1 tab(s) orally every 6 hours -for moderate pain MDD:4 tabs  traMADol 50 mg oral tablet: 75 milligram(s) orally every 6 hours  -for moderate pain MDD:4 tabs

## 2022-01-23 NOTE — DISCHARGE NOTE PROVIDER - CARE PROVIDER_API CALL
Nayan Mckinney (MD)  Surgery  [address] [place], [place] [id number]  Phone: [phone]  Fax: [phone]  Scheduled Appointment: 01/28/2022 09:00 AM    Justin Anton  Gastroenterology  [address] [place] [address]  Phone: [phone]  Fax: [phone]  Follow Up Time: 1 week

## 2022-01-23 NOTE — DISCHARGE NOTE PROVIDER - NSDCPNSUBOBJ_GEN_ALL_CORE
Patient is a 31y old  Male who presents with a chief complaint of RUQ pain (23 Jan 2022 11:17)    Patient was seen and examined at bedside   Complains of pain at the surgical site, better than yesterday, noted to have used IV dilaudid     INTERVAL HPI/OVERNIGHT EVENTS:  T(C): 36.7 (01-23-22 @ 13:20), Max: 36.8 (01-22-22 @ 19:21)  HR: 112 (01-23-22 @ 13:20) (74 - 112)  BP: 149/87 (01-23-22 @ 13:20) (138/82 - 158/88)  RR: 18 (01-23-22 @ 13:20) (17 - 18)  SpO2: 100% (01-23-22 @ 13:20) (96% - 100%)  Wt(kg): --  I&O's Summary    22 Jan 2022 07:01  -  23 Jan 2022 07:00  --------------------------------------------------------  IN: 0 mL / OUT: 10 mL / NET: -10 mL        REVIEW OF SYSTEMS: denies fever, chills, SOB, palpitations, chest pain, nausea, vomiting, diarrhea, constipation, dizziness    MEDICATIONS  (STANDING):  benzocaine 15 mG/menthol 3.6 mG Lozenge 1 Lozenge Oral every 8 hours  chlorhexidine 2% Cloths 1 Application(s) Topical two times a day  ciprofloxacin   IVPB      ciprofloxacin   IVPB 400 milliGRAM(s) IV Intermittent every 12 hours  guaiFENesin Oral Liquid (Sugar-Free) 200 milliGRAM(s) Oral every 6 hours  metroNIDAZOLE  IVPB      metroNIDAZOLE  IVPB 500 milliGRAM(s) IV Intermittent every 8 hours  pantoprazole    Tablet 40 milliGRAM(s) Oral before breakfast  senna 2 Tablet(s) Oral at bedtime    MEDICATIONS  (PRN):  HYDROmorphone   Tablet 2 milliGRAM(s) Oral every 4 hours PRN Severe Pain (7 - 10)  ondansetron Injectable 4 milliGRAM(s) IV Push every 6 hours PRN Nausea and/or Vomiting  polyethylene glycol 3350 17 Gram(s) Oral daily PRN Constipation  saline laxative (FLEET) Rectal Enema 1 Enema Rectal daily PRN If Miralax not therapeutic      PHYSICAL EXAM:  GENERAL: NAD, obese  HEAD:  Atraumatic, Normocephalic  NERVOUS SYSTEM:  Alert & Oriented X3, Good concentration; Motor Strength 5/5 B/L upper and lower extremities  CHEST/LUNG: Clear to auscultation bilaterally; No rales, rhonchi, wheezing, or rubs  HEART: Regular rate and rhythm; No murmurs, rubs, or gallops  ABDOMEN: Soft, RUQ tender, Nondistended; Bowel sounds present, SHARON draining sanguinous fluid  EXTREMITIES:  2+ Peripheral Pulses, No clubbing, cyanosis, or edema  SKIN: No rashes or lesions    LABS:                        11.4   7.15  )-----------( 372      ( 23 Jan 2022 06:48 )             35.4     01-23    141  |  106  |  8   ----------------------------<  98  4.0   |  31  |  1.23    Ca    9.1      23 Jan 2022 06:48  Mg     2.1     01-23    TPro  7.0  /  Alb  2.7<L>  /  TBili  0.5  /  DBili  x   /  AST  117<H>  /  ALT  207<H>  /  AlkPhos  184<H>  01-23      A/P:  Gangrenous GB with biliary colic, s/p Lap Bhumika   Post operative pain management   THEA  Transaminitis  COVID infection asymptomatic    Plan:  Tolerating diet  Needed IV Dilaudid this morning   LFTs trended down  DC with Cipro and Flagyl for gangrenous GB  Renal function stable  COVID status stable, not needing O2  Cont Robitussin for cough as it increases pain   DC today with Tramadol for pain at home   Stable to be discharged. Discussed with patient. Total 35mins spent is discharge planning.

## 2022-01-23 NOTE — DISCHARGE NOTE PROVIDER - HOSPITAL COURSE
31 years old male with no known medical hx who presented on 1/18 with 2 day hx of RUQ pain; US liver showed biliary sludge but no stones. Admitted to medicine for cholelithiasis, SX and GI consulted- Incidentally found COVID + but remained asymptomatic. S/P lap. cholecystectomy 1/21 with SHARON Tube, started on cipro + flagyl. Tolerated diet advancement well, pain management followed   Given clinical course, outpatient plan to discharge patient home with outpatient follow up. Will continue abx to complete 7 day course, + tramadol Q6 PRN X3 days and follow up with Dr Mckinney (surgeon) Thursday 1/27  Discharge discussed with attending   This is only a brief summary of patient's hospital stay, for full course please see EMR

## 2022-01-23 NOTE — PROGRESS NOTE ADULT - SUBJECTIVE AND OBJECTIVE BOX
INTERVAL HPI/OVERNIGHT EVENTS:  Pt seen and examined at bedside.   Pt resting comfortably. Appropriate abdominal pain  Tolerating diet, denies N/V  SHARON    MEDICATIONS  (STANDING):  acetaminophen   IVPB .. 1000 milliGRAM(s) IV Intermittent once  benzocaine 15 mG/menthol 3.6 mG Lozenge 1 Lozenge Oral every 8 hours  chlorhexidine 2% Cloths 1 Application(s) Topical two times a day  ciprofloxacin   IVPB      ciprofloxacin   IVPB 400 milliGRAM(s) IV Intermittent every 12 hours  guaiFENesin Oral Liquid (Sugar-Free) 200 milliGRAM(s) Oral every 6 hours  metroNIDAZOLE  IVPB      metroNIDAZOLE  IVPB 500 milliGRAM(s) IV Intermittent every 8 hours  pantoprazole    Tablet 40 milliGRAM(s) Oral before breakfast  senna 2 Tablet(s) Oral at bedtime    MEDICATIONS  (PRN):  HYDROmorphone   Tablet 2 milliGRAM(s) Oral every 4 hours PRN Severe Pain (7 - 10)  ondansetron Injectable 4 milliGRAM(s) IV Push every 6 hours PRN Nausea and/or Vomiting  polyethylene glycol 3350 17 Gram(s) Oral daily PRN Constipation  saline laxative (FLEET) Rectal Enema 1 Enema Rectal daily PRN If Miralax not therapeutic      Vital Signs Last 24 Hrs  T(C): 36.6 (23 Jan 2022 06:06), Max: 37.3 (22 Jan 2022 13:07)  T(F): 97.9 (23 Jan 2022 06:06), Max: 99.1 (22 Jan 2022 13:07)  HR: 74 (23 Jan 2022 06:06) (74 - 81)  BP: 138/82 (23 Jan 2022 06:06) (135/87 - 158/88)  BP(mean): --  RR: 17 (23 Jan 2022 06:06) (17 - 18)  SpO2: 96% (23 Jan 2022 06:06) (96% - 97%)    Physical:  General: A&Ox3. NAD.  Respirations: Unlabored   Abdomen: Soft nondistended, appropriate incisional tenderness, SHARON SS, no rebound, no guarding, no peritonitis     I&O's Detail    22 Jan 2022 07:01  -  23 Jan 2022 07:00  --------------------------------------------------------  IN:  Total IN: 0 mL    OUT:    Bulb (mL): 10 mL  Total OUT: 10 mL    Total NET: -10 mL          LABS:                        11.4   7.15  )-----------( 372      ( 23 Jan 2022 06:48 )             35.4             01-23    141  |  106  |  8   ----------------------------<  98  4.0   |  31  |  1.23    Ca    9.1      23 Jan 2022 06:48  Mg     2.1     01-23    TPro  7.0  /  Alb  2.7<L>  /  TBili  0.5  /  DBili  x   /  AST  117<H>  /  ALT  207<H>  /  AlkPhos  184<H>  01-23

## 2022-01-24 VITALS
OXYGEN SATURATION: 97 % | DIASTOLIC BLOOD PRESSURE: 90 MMHG | HEART RATE: 67 BPM | RESPIRATION RATE: 18 BRPM | SYSTOLIC BLOOD PRESSURE: 153 MMHG | TEMPERATURE: 98 F

## 2022-01-24 PROCEDURE — 99239 HOSP IP/OBS DSCHRG MGMT >30: CPT

## 2022-01-24 PROCEDURE — 85027 COMPLETE CBC AUTOMATED: CPT

## 2022-01-24 PROCEDURE — 85730 THROMBOPLASTIN TIME PARTIAL: CPT

## 2022-01-24 PROCEDURE — C1889: CPT

## 2022-01-24 PROCEDURE — 71046 X-RAY EXAM CHEST 2 VIEWS: CPT

## 2022-01-24 PROCEDURE — 83690 ASSAY OF LIPASE: CPT

## 2022-01-24 PROCEDURE — 86140 C-REACTIVE PROTEIN: CPT

## 2022-01-24 PROCEDURE — 36415 COLL VENOUS BLD VENIPUNCTURE: CPT

## 2022-01-24 PROCEDURE — 86900 BLOOD TYPING SEROLOGIC ABO: CPT

## 2022-01-24 PROCEDURE — 93005 ELECTROCARDIOGRAM TRACING: CPT

## 2022-01-24 PROCEDURE — 83735 ASSAY OF MAGNESIUM: CPT

## 2022-01-24 PROCEDURE — 82728 ASSAY OF FERRITIN: CPT

## 2022-01-24 PROCEDURE — 83605 ASSAY OF LACTIC ACID: CPT

## 2022-01-24 PROCEDURE — 87075 CULTR BACTERIA EXCEPT BLOOD: CPT

## 2022-01-24 PROCEDURE — 86850 RBC ANTIBODY SCREEN: CPT

## 2022-01-24 PROCEDURE — 84145 PROCALCITONIN (PCT): CPT

## 2022-01-24 PROCEDURE — 85610 PROTHROMBIN TIME: CPT

## 2022-01-24 PROCEDURE — 87205 SMEAR GRAM STAIN: CPT

## 2022-01-24 PROCEDURE — 85379 FIBRIN DEGRADATION QUANT: CPT

## 2022-01-24 PROCEDURE — 87070 CULTURE OTHR SPECIMN AEROBIC: CPT

## 2022-01-24 PROCEDURE — 88304 TISSUE EXAM BY PATHOLOGIST: CPT

## 2022-01-24 PROCEDURE — 85025 COMPLETE CBC W/AUTO DIFF WBC: CPT

## 2022-01-24 PROCEDURE — 99285 EMERGENCY DEPT VISIT HI MDM: CPT | Mod: 25

## 2022-01-24 PROCEDURE — 83880 ASSAY OF NATRIURETIC PEPTIDE: CPT

## 2022-01-24 PROCEDURE — 83615 LACTATE (LD) (LDH) ENZYME: CPT

## 2022-01-24 PROCEDURE — 80053 COMPREHEN METABOLIC PANEL: CPT

## 2022-01-24 PROCEDURE — 84100 ASSAY OF PHOSPHORUS: CPT

## 2022-01-24 PROCEDURE — 84484 ASSAY OF TROPONIN QUANT: CPT

## 2022-01-24 PROCEDURE — 86901 BLOOD TYPING SEROLOGIC RH(D): CPT

## 2022-01-24 RX ORDER — TRAMADOL HYDROCHLORIDE 50 MG/1
75 TABLET ORAL EVERY 8 HOURS
Refills: 0 | Status: DISCONTINUED | OUTPATIENT
Start: 2022-01-24 | End: 2022-01-24

## 2022-01-24 RX ORDER — TRAMADOL HYDROCHLORIDE 50 MG/1
75 TABLET ORAL
Qty: 12 | Refills: 0
Start: 2022-01-24 | End: 2022-01-26

## 2022-01-24 RX ADMIN — BENZOCAINE AND MENTHOL 1 LOZENGE: 5; 1 LIQUID ORAL at 05:52

## 2022-01-24 RX ADMIN — CHLORHEXIDINE GLUCONATE 1 APPLICATION(S): 213 SOLUTION TOPICAL at 05:52

## 2022-01-24 RX ADMIN — Medication 200 MILLIGRAM(S): at 01:04

## 2022-01-24 RX ADMIN — Medication 200 MILLIGRAM(S): at 12:28

## 2022-01-24 RX ADMIN — TRAMADOL HYDROCHLORIDE 75 MILLIGRAM(S): 50 TABLET ORAL at 12:26

## 2022-01-24 RX ADMIN — Medication 100 MILLIGRAM(S): at 05:52

## 2022-01-24 RX ADMIN — Medication 200 MILLIGRAM(S): at 05:53

## 2022-01-24 RX ADMIN — Medication 100 MILLIGRAM(S): at 13:45

## 2022-01-24 RX ADMIN — Medication 200 MILLIGRAM(S): at 05:52

## 2022-01-24 RX ADMIN — TRAMADOL HYDROCHLORIDE 75 MILLIGRAM(S): 50 TABLET ORAL at 13:44

## 2022-01-24 RX ADMIN — BENZOCAINE AND MENTHOL 1 LOZENGE: 5; 1 LIQUID ORAL at 14:56

## 2022-01-24 NOTE — PROGRESS NOTE ADULT - SUBJECTIVE AND OBJECTIVE BOX
Patient is a 31y old  Male who presents with a chief complaint of RUQ pain (24 Jan 2022 08:38)    Patient was seen and examined at bedside   Reports pain is better after removal of SHARON drain     INTERVAL HPI/OVERNIGHT EVENTS:  T(C): 36.9 (01-24-22 @ 05:29), Max: 37 (01-23-22 @ 21:41)  HR: 62 (01-24-22 @ 05:29) (62 - 112)  BP: 154/88 (01-24-22 @ 05:29) (147/92 - 154/88)  RR: 16 (01-24-22 @ 05:29) (16 - 18)  SpO2: 98% (01-24-22 @ 05:29) (96% - 100%)  Wt(kg): --  I&O's Summary    23 Jan 2022 07:01  -  24 Jan 2022 07:00  --------------------------------------------------------  IN: 0 mL / OUT: 810 mL / NET: -810 mL        REVIEW OF SYSTEMS: denies fever, chills, SOB, palpitations, chest pain, abdominal pain, nausea, vomiting, diarrhea, constipation, dizziness    MEDICATIONS  (STANDING):  benzocaine 15 mG/menthol 3.6 mG Lozenge 1 Lozenge Oral every 8 hours  chlorhexidine 2% Cloths 1 Application(s) Topical two times a day  ciprofloxacin   IVPB      ciprofloxacin   IVPB 400 milliGRAM(s) IV Intermittent every 12 hours  guaiFENesin Oral Liquid (Sugar-Free) 200 milliGRAM(s) Oral every 6 hours  metroNIDAZOLE  IVPB      metroNIDAZOLE  IVPB 500 milliGRAM(s) IV Intermittent every 8 hours  pantoprazole    Tablet 40 milliGRAM(s) Oral before breakfast  senna 2 Tablet(s) Oral at bedtime    MEDICATIONS  (PRN):  ondansetron Injectable 4 milliGRAM(s) IV Push every 6 hours PRN Nausea and/or Vomiting  polyethylene glycol 3350 17 Gram(s) Oral daily PRN Constipation  saline laxative (FLEET) Rectal Enema 1 Enema Rectal daily PRN If Miralax not therapeutic  traMADol 75 milliGRAM(s) Oral every 8 hours PRN Severe Pain (7 - 10)      PHYSICAL EXAM:  GENERAL: NAD, well-groomed, well-developed  HEAD:  Atraumatic, Normocephalic  EYES: EOMI, PERRLA, conjunctiva and sclera clear  ENMT: No tonsillar erythema, exudates, or enlargement; Moist mucous membranes, Good dentition, No lesions  NECK: Supple, No JVD, Normal thyroid  NERVOUS SYSTEM:  Alert & Oriented X3, Good concentration; Motor Strength 5/5 B/L upper and lower extremities; DTRs 2+ intact and symmetric  CHEST/LUNG: Clear to percussion bilaterally; No rales, rhonchi, wheezing, or rubs  HEART: Regular rate and rhythm; No murmurs, rubs, or gallops  ABDOMEN: Soft, Nontender, Nondistended; Bowel sounds present  EXTREMITIES:  2+ Peripheral Pulses, No clubbing, cyanosis, or edema  LYMPH: No lymphadenopathy noted  SKIN: No rashes or lesions  LABS:                        11.4   7.15  )-----------( 372      ( 23 Jan 2022 06:48 )             35.4     01-23    141  |  106  |  8   ----------------------------<  98  4.0   |  31  |  1.23    Ca    9.1      23 Jan 2022 06:48  Mg     2.1     01-23    TPro  7.0  /  Alb  2.7<L>  /  TBili  0.5  /  DBili  x   /  AST  117<H>  /  ALT  207<H>  /  AlkPhos  184<H>  01-23        CAPILLARY BLOOD GLUCOSE

## 2022-01-24 NOTE — PROGRESS NOTE ADULT - ASSESSMENT
32 y/o Male s/p lap cholecystectomy 1/21; COVID+    -Regular diet   -SHARON drain removed at bedside   -Abx   -OOB/Ambulate   -DVT ppx   -Supportive care   -Pt stable for discharge form surgical point of view, pt fallon be dc w/ PO abx, pt to follow up with Dr Mckinney In office   -Reconsult as needed

## 2022-01-24 NOTE — PROGRESS NOTE ADULT - PROVIDER SPECIALTY LIST ADULT
Internal Medicine
Surgery
Surgery
Internal Medicine
Internal Medicine
Surgery
Internal Medicine
Surgery
Internal Medicine

## 2022-01-24 NOTE — PROGRESS NOTE ADULT - SUBJECTIVE AND OBJECTIVE BOX
INTERVAL HPI/OVERNIGHT EVENTS:    Pt seen and examined at bedside. Offers no acute complaints at this time. Tolerating regular diet well.   On isolation, COVID +    Vital Signs Last 24 Hrs  T(C): 36.9 (24 Jan 2022 05:29), Max: 37 (23 Jan 2022 21:41)  T(F): 98.5 (24 Jan 2022 05:29), Max: 98.6 (23 Jan 2022 21:41)  HR: 62 (24 Jan 2022 05:29) (62 - 112)  BP: 154/88 (24 Jan 2022 05:29) (147/92 - 154/88)  BP(mean): --  RR: 16 (24 Jan 2022 05:29) (16 - 18)  SpO2: 98% (24 Jan 2022 05:29) (96% - 100%)  I&O's Detail    23 Jan 2022 07:01  -  24 Jan 2022 07:00  --------------------------------------------------------  IN:  Total IN: 0 mL    OUT:    Bulb (mL): 10 mL    Voided (mL): 800 mL  Total OUT: 810 mL    Total NET: -810 mL        ciprofloxacin   IVPB      ciprofloxacin   IVPB 400 milliGRAM(s) IV Intermittent every 12 hours  metroNIDAZOLE  IVPB      metroNIDAZOLE  IVPB 500 milliGRAM(s) IV Intermittent every 8 hours  pantoprazole    Tablet 40 milliGRAM(s) Oral before breakfast  polyethylene glycol 3350 17 Gram(s) Oral daily PRN  saline laxative (FLEET) Rectal Enema 1 Enema Rectal daily PRN  senna 2 Tablet(s) Oral at bedtime      Physical Exam  General: AAOx3, No acute distress  Skin: No jaundice, no icterus  Pulm: Respirations non labored   Abdomen: soft, nondistended, nontender, no rebound tenderness, no guarding, no palpable masses, dressing c/d/i, SHARON in place, ss output  Extremities: non edematous, no calf pain bilaterally        Labs:                        11.4   7.15  )-----------( 372      ( 23 Jan 2022 06:48 )             35.4     01-23    141  |  106  |  8   ----------------------------<  98  4.0   |  31  |  1.23    Ca    9.1      23 Jan 2022 06:48  Mg     2.1     01-23    TPro  7.0  /  Alb  2.7<L>  /  TBili  0.5  /  DBili  x   /  AST  117<H>  /  ALT  207<H>  /  AlkPhos  184<H>  01-23

## 2022-01-24 NOTE — PROGRESS NOTE ADULT - ASSESSMENT
Gangrenous GB with biliary colic, s/p Lap Bhumika   Post operative pain management   THEA  Transaminitis  COVID infection asymptomatic    Plan:  Tolerating diet  Pain controlled  Agree with Cipro and Flagyl for gangrenous GB on discharge  Hold Lovenox for DVT ppx  Incentive spirometry  Cont IVF, monitor renal function   COVID status stable, not needing O2  Cont Robitussin for cough as it increases pain   Stable to be discharged. Discussed with patient.

## 2022-01-26 LAB
CULTURE RESULTS: SIGNIFICANT CHANGE UP
SPECIMEN SOURCE: SIGNIFICANT CHANGE UP
SURGICAL PATHOLOGY STUDY: SIGNIFICANT CHANGE UP

## 2022-02-14 ENCOUNTER — EMERGENCY (EMERGENCY)
Facility: HOSPITAL | Age: 32
LOS: 1 days | Discharge: HOME | End: 2022-02-14
Attending: EMERGENCY MEDICINE
Payer: MEDICAID

## 2022-02-14 VITALS
TEMPERATURE: 98 F | DIASTOLIC BLOOD PRESSURE: 87 MMHG | HEART RATE: 86 BPM | HEIGHT: 75 IN | WEIGHT: 315 LBS | SYSTOLIC BLOOD PRESSURE: 144 MMHG | OXYGEN SATURATION: 100 % | RESPIRATION RATE: 19 BRPM

## 2022-02-14 LAB
ALBUMIN SERPL ELPH-MCNC: 4.2 G/DL — SIGNIFICANT CHANGE UP (ref 3.5–5)
ALP SERPL-CCNC: 44 U/L — SIGNIFICANT CHANGE UP (ref 40–120)
ALT FLD-CCNC: 41 U/L DA — SIGNIFICANT CHANGE UP (ref 10–60)
ANION GAP SERPL CALC-SCNC: 3 MMOL/L — LOW (ref 5–17)
APPEARANCE UR: CLEAR — SIGNIFICANT CHANGE UP
AST SERPL-CCNC: 22 U/L — SIGNIFICANT CHANGE UP (ref 10–40)
BASOPHILS # BLD AUTO: 0.04 K/UL — SIGNIFICANT CHANGE UP (ref 0–0.2)
BASOPHILS NFR BLD AUTO: 0.7 % — SIGNIFICANT CHANGE UP (ref 0–2)
BILIRUB SERPL-MCNC: 0.3 MG/DL — SIGNIFICANT CHANGE UP (ref 0.2–1.2)
BILIRUB UR-MCNC: NEGATIVE — SIGNIFICANT CHANGE UP
BUN SERPL-MCNC: 11 MG/DL — SIGNIFICANT CHANGE UP (ref 7–18)
CALCIUM SERPL-MCNC: 8.6 MG/DL — SIGNIFICANT CHANGE UP (ref 8.4–10.5)
CHLORIDE SERPL-SCNC: 107 MMOL/L — SIGNIFICANT CHANGE UP (ref 96–108)
CO2 SERPL-SCNC: 28 MMOL/L — SIGNIFICANT CHANGE UP (ref 22–31)
COLOR SPEC: YELLOW — SIGNIFICANT CHANGE UP
CREAT SERPL-MCNC: 1.31 MG/DL — HIGH (ref 0.5–1.3)
DIFF PNL FLD: ABNORMAL
EOSINOPHIL # BLD AUTO: 0.14 K/UL — SIGNIFICANT CHANGE UP (ref 0–0.5)
EOSINOPHIL NFR BLD AUTO: 2.5 % — SIGNIFICANT CHANGE UP (ref 0–6)
GLUCOSE SERPL-MCNC: 102 MG/DL — HIGH (ref 70–99)
GLUCOSE UR QL: NEGATIVE — SIGNIFICANT CHANGE UP
HCT VFR BLD CALC: 43 % — SIGNIFICANT CHANGE UP (ref 39–50)
HGB BLD-MCNC: 13.8 G/DL — SIGNIFICANT CHANGE UP (ref 13–17)
IMM GRANULOCYTES NFR BLD AUTO: 0.2 % — SIGNIFICANT CHANGE UP (ref 0–1.5)
KETONES UR-MCNC: NEGATIVE — SIGNIFICANT CHANGE UP
LEUKOCYTE ESTERASE UR-ACNC: NEGATIVE — SIGNIFICANT CHANGE UP
LIDOCAIN IGE QN: 162 U/L — SIGNIFICANT CHANGE UP (ref 73–393)
LYMPHOCYTES # BLD AUTO: 2.69 K/UL — SIGNIFICANT CHANGE UP (ref 1–3.3)
LYMPHOCYTES # BLD AUTO: 47.6 % — HIGH (ref 13–44)
MCHC RBC-ENTMCNC: 28 PG — SIGNIFICANT CHANGE UP (ref 27–34)
MCHC RBC-ENTMCNC: 32.1 GM/DL — SIGNIFICANT CHANGE UP (ref 32–36)
MCV RBC AUTO: 87.2 FL — SIGNIFICANT CHANGE UP (ref 80–100)
MONOCYTES # BLD AUTO: 0.47 K/UL — SIGNIFICANT CHANGE UP (ref 0–0.9)
MONOCYTES NFR BLD AUTO: 8.3 % — SIGNIFICANT CHANGE UP (ref 2–14)
NEUTROPHILS # BLD AUTO: 2.3 K/UL — SIGNIFICANT CHANGE UP (ref 1.8–7.4)
NEUTROPHILS NFR BLD AUTO: 40.7 % — LOW (ref 43–77)
NITRITE UR-MCNC: NEGATIVE — SIGNIFICANT CHANGE UP
NRBC # BLD: 0 /100 WBCS — SIGNIFICANT CHANGE UP (ref 0–0)
PH UR: 6 — SIGNIFICANT CHANGE UP (ref 5–8)
PLATELET # BLD AUTO: 297 K/UL — SIGNIFICANT CHANGE UP (ref 150–400)
POTASSIUM SERPL-MCNC: 3.9 MMOL/L — SIGNIFICANT CHANGE UP (ref 3.5–5.3)
POTASSIUM SERPL-SCNC: 3.9 MMOL/L — SIGNIFICANT CHANGE UP (ref 3.5–5.3)
PROT SERPL-MCNC: 7.6 G/DL — SIGNIFICANT CHANGE UP (ref 6–8.3)
PROT UR-MCNC: 15
RBC # BLD: 4.93 M/UL — SIGNIFICANT CHANGE UP (ref 4.2–5.8)
RBC # FLD: 14 % — SIGNIFICANT CHANGE UP (ref 10.3–14.5)
SODIUM SERPL-SCNC: 138 MMOL/L — SIGNIFICANT CHANGE UP (ref 135–145)
SP GR SPEC: 1.02 — SIGNIFICANT CHANGE UP (ref 1.01–1.02)
UROBILINOGEN FLD QL: NEGATIVE — SIGNIFICANT CHANGE UP
WBC # BLD: 5.65 K/UL — SIGNIFICANT CHANGE UP (ref 3.8–10.5)
WBC # FLD AUTO: 5.65 K/UL — SIGNIFICANT CHANGE UP (ref 3.8–10.5)

## 2022-02-14 PROCEDURE — 87086 URINE CULTURE/COLONY COUNT: CPT

## 2022-02-14 PROCEDURE — 74177 CT ABD & PELVIS W/CONTRAST: CPT | Mod: MA

## 2022-02-14 PROCEDURE — 83690 ASSAY OF LIPASE: CPT

## 2022-02-14 PROCEDURE — 81001 URINALYSIS AUTO W/SCOPE: CPT

## 2022-02-14 PROCEDURE — 99284 EMERGENCY DEPT VISIT MOD MDM: CPT | Mod: 25

## 2022-02-14 PROCEDURE — 74177 CT ABD & PELVIS W/CONTRAST: CPT | Mod: 26,MA

## 2022-02-14 PROCEDURE — 80053 COMPREHEN METABOLIC PANEL: CPT

## 2022-02-14 PROCEDURE — 85025 COMPLETE CBC W/AUTO DIFF WBC: CPT

## 2022-02-14 PROCEDURE — 36415 COLL VENOUS BLD VENIPUNCTURE: CPT

## 2022-02-14 PROCEDURE — 99283 EMERGENCY DEPT VISIT LOW MDM: CPT | Mod: 24

## 2022-02-14 PROCEDURE — 99285 EMERGENCY DEPT VISIT HI MDM: CPT

## 2022-02-14 PROCEDURE — 96374 THER/PROPH/DIAG INJ IV PUSH: CPT | Mod: XU

## 2022-02-14 RX ORDER — MORPHINE SULFATE 50 MG/1
4 CAPSULE, EXTENDED RELEASE ORAL ONCE
Refills: 0 | Status: DISCONTINUED | OUTPATIENT
Start: 2022-02-14 | End: 2022-02-14

## 2022-02-14 RX ORDER — SODIUM CHLORIDE 9 MG/ML
1000 INJECTION INTRAMUSCULAR; INTRAVENOUS; SUBCUTANEOUS ONCE
Refills: 0 | Status: COMPLETED | OUTPATIENT
Start: 2022-02-14 | End: 2022-02-14

## 2022-02-14 RX ADMIN — SODIUM CHLORIDE 1000 MILLILITER(S): 9 INJECTION INTRAMUSCULAR; INTRAVENOUS; SUBCUTANEOUS at 19:42

## 2022-02-14 RX ADMIN — MORPHINE SULFATE 4 MILLIGRAM(S): 50 CAPSULE, EXTENDED RELEASE ORAL at 19:42

## 2022-02-14 NOTE — ED PROVIDER NOTE - CARE PROVIDER_API CALL
Nayan Mckinney (MD)  Surgery  [address] [place], [place] [id number]  Phone: [phone]  Fax: [phone]  Follow Up Time:

## 2022-02-14 NOTE — ED PROVIDER NOTE - CLINICAL SUMMARY MEDICAL DECISION MAKING FREE TEXT BOX
PT with right sided and epigastric pain and tenderness to palpation. morphine given for pain, surgery consulted. pending CT scan.

## 2022-02-14 NOTE — CONSULT NOTE ADULT - SUBJECTIVE AND OBJECTIVE BOX
30 y/o male s/p lap ángel 1/21; covid+1/17  comes to ED with c/o abd pain around mid upper incision and R flank for several weeks  no f/c  +n/v/diarrhea which prompted ED visit    Vital Signs Last 24 Hrs  T(C): 36.6 (14 Feb 2022 18:27), Max: 36.6 (14 Feb 2022 18:27)  T(F): 97.9 (14 Feb 2022 18:27), Max: 97.9 (14 Feb 2022 18:27)  HR: 86 (14 Feb 2022 18:27) (86 - 86)  BP: 144/87 (14 Feb 2022 18:27) (144/87 - 144/87)  BP(mean): --  RR: 19 (14 Feb 2022 18:27) (19 - 19)  SpO2: 100% (14 Feb 2022 18:27) (100% - 100%)                          13.8   5.65  )-----------( 297      ( 14 Feb 2022 19:47 )             43.0   02-14    138  |  107  |  11  ----------------------------<  102<H>  3.9   |  28  |  1.31<H>    Ca    8.6      14 Feb 2022 19:47    TPro  7.6  /  Alb  4.2  /  TBili  0.3  /  DBili  x   /  AST  22  /  ALT  41  /  AlkPhos  44  02-14    General: WN/WD NAD  Neurology: A&Ox3, nonfocal, SHEFFIELD x 4  Respiratory: CTA B/L  CV: RRR, S1S2, no murmurs, rubs or gallops  Abdominal: Soft, obese, tender epigastric region around incision and R flank incision, no cellulitis or drainage, no guarding or peritoneal signs  Extremities: No edema, + peripheral pulses    30 y/o male s/p lap ángel 1/21  +n/v/diarrhea, abd pain  no leukocytosis or fever, LFTs wnl  needs iv hydration as creat^   no acute surgical findings at this time  case discussed with Dr Mckinney   30 y/o male s/p lap ángel 1/21; covid+1/17  comes to ED with c/o abd pain around mid upper incision and R flank for several weeks  no f/c  +n/v/diarrhea which prompted ED visit    < from: CT Abdomen and Pelvis w/ IV Cont (02.14.22 @ 22:52) >  FINDINGS:  LOWER CHEST: Within normal limits.    LIVER: Within normal limits.  BILE DUCTS: Normal caliber.  GALLBLADDER: Cholecystectomy. Unremarkable postsurgical appearance.  SPLEEN: Within normal limits.  PANCREAS: Within normal limits.  ADRENALS: Within normal limits.  KIDNEYS/URETERS: Within normal limits.    BLADDER: Within normal limits.  REPRODUCTIVE ORGANS: Prostate within normal limits.    BOWEL: No bowel obstruction. Appendix is normal.  PERITONEUM: No ascites.  VESSELS: Within normal limits.  RETROPERITONEUM/LYMPH NODES: No lymphadenopathy.  ABDOMINAL WALL: Within normal limits.  BONES: Within normal limits.    IMPRESSION:    Cholecystectomy. Unremarkable postsurgical appearance.      < end of copied text >      Vital Signs Last 24 Hrs  T(C): 36.6 (14 Feb 2022 18:27), Max: 36.6 (14 Feb 2022 18:27)  T(F): 97.9 (14 Feb 2022 18:27), Max: 97.9 (14 Feb 2022 18:27)  HR: 86 (14 Feb 2022 18:27) (86 - 86)  BP: 144/87 (14 Feb 2022 18:27) (144/87 - 144/87)  BP(mean): --  RR: 19 (14 Feb 2022 18:27) (19 - 19)  SpO2: 100% (14 Feb 2022 18:27) (100% - 100%)                          13.8   5.65  )-----------( 297      ( 14 Feb 2022 19:47 )             43.0   02-14    138  |  107  |  11  ----------------------------<  102<H>  3.9   |  28  |  1.31<H>    Ca    8.6      14 Feb 2022 19:47    TPro  7.6  /  Alb  4.2  /  TBili  0.3  /  DBili  x   /  AST  22  /  ALT  41  /  AlkPhos  44  02-14    General: WN/WD NAD  Neurology: A&Ox3, nonfocal, SHEFFIELD x 4  Respiratory: CTA B/L  CV: RRR, S1S2, no murmurs, rubs or gallops  Abdominal: Soft, obese, tender epigastric region around incision and R flank incision, no cellulitis or drainage, no guarding or peritoneal signs  Extremities: No edema, + peripheral pulses    30 y/o male s/p lap ángel 1/21  +n/v/diarrhea, abd pain  no leukocytosis or fever, LFTs wnl  needs iv hydration as creat^   no acute surgical findings at this time  case discussed with Dr Mckinney

## 2022-02-14 NOTE — ED PROVIDER NOTE - OBJECTIVE STATEMENT
PT with cholecystectomy 3 weeks ago by Dr Mckinney p/w continued pain in right abdomen, nausea. no fever. having normal bowel movement. patient has not followed up with surgery outpatient since his procedure

## 2022-02-14 NOTE — ED ADULT NURSE NOTE - OBJECTIVE STATEMENT
pt from home c/o of medial and Rt upper abdomen pain x3 weeks s/p gallbladder removal surgery 3 weeks ago, pt states "pain is not getting any better"

## 2022-02-14 NOTE — ED PROVIDER NOTE - PATIENT PORTAL LINK FT
You can access the FollowMyHealth Patient Portal offered by [facility] by registering at the following website: [email or web address]. By joining [employer]’s FollowMyHealth portal, you will also be able to view your health information using other applications (apps) compatible with our system. You can access the FollowMyHealth Patient Portal offered by [facility] by registering at the following website: [email or web address]. By joining [employer]’s FollowMyHealth portal, you will also be able to view your health information using other applications (apps) compatible with our system.

## 2022-02-14 NOTE — ED ADULT NURSE NOTE - RADIATION
-test BG AC/HS  -C/w Lantus 24 units QAM.  -C/w Humalog ac meals 10 units for now  -C/w Humalog moderate correction scale AC and low HS scale  Discharge plan: basal/bolus  -Pt. unable to care for her DM and will need someone else to do insulin and DM care if discharge home.   -f/u outpt w/Dr Saul. Will make apt closer to discharge    --Plan discussed with pt/team/RN.   Contact info: [phone] (24/7) Rt upper abdomen

## 2022-02-14 NOTE — ED ADULT NURSE NOTE - NSIMPLEMENTINTERV_GEN_ALL_ED
Implemented All Universal Safety Interventions:  [place] to call system. Call bell, personal items and telephone within reach. Instruct patient to call for assistance. Room bathroom lighting operational. Non-slip footwear when patient is off stretcher. Physically safe environment: no spills, clutter or unnecessary equipment. Stretcher in lowest position, wheels locked, appropriate side rails in place.

## 2022-02-14 NOTE — ED PROVIDER NOTE - DATE/TIME 1
Attempted to call patient to regarding his appointment today at the infusion center. Patient did not answer and his voicemail was not set up. 14-Feb-2022 23:52

## 2022-02-14 NOTE — ED PROVIDER NOTE - NSFOLLOWUPINSTRUCTIONS_ED_ALL_ED_FT
Followup with Dr. Mckinney for reevaluation.  Return to ED if you develop fever>100.8F, vomiting or severe abdominal pain.      Abdominal Pain    WHAT YOU NEED TO KNOW:    Abdominal pain can be dull, achy, or sharp. You may have pain in one area of your abdomen, or in your entire abdomen. Your pain may be caused by a condition such as constipation, food sensitivity or poisoning, infection, or a blockage. Abdominal pain can also be from a hernia, appendicitis, or an ulcer. Liver, gallbladder, or kidney conditions can also cause abdominal pain. The cause of your abdominal pain may not be known.    Abdominal Organs         DISCHARGE INSTRUCTIONS:    Call your local emergency number (911 in the [place]) if:   •You have new chest pain or shortness of breath.          Return to the emergency department if:   •You have pulsing pain in your upper abdomen or lower back that suddenly becomes constant.      •Your pain is in the right lower abdominal area and worsens with movement.      •You have a fever over 100.4°F (38°C) or shaking chills.      •You are vomiting and cannot keep food or liquids down.      •Your pain does not improve or gets worse over the next 8 to 12 hours.      •You see blood in your vomit or bowel movements, or they look black and tarry.      •Your skin or the whites of your eyes turn yellow.      •You are a woman and have a large amount of vaginal bleeding that is not your monthly period.      Call your doctor if:   •You have pain in your lower back.      •You are a man and have pain in your testicles.      •You have pain when you urinate.      •You have questions or concerns about your condition or care.      Medicines:   •Prescription pain medicine may be given. Ask your healthcare provider how to take this medicine safely. Some prescription pain medicines contain acetaminophen. Do not take other medicines that contain acetaminophen without talking to your healthcare provider. Too much acetaminophen may cause liver damage. Prescription pain medicine may cause constipation. Ask your healthcare provider how to prevent or treat constipation.       •Medicines may be given to calm your stomach or prevent vomiting.      •Take your medicine as directed. Contact your healthcare provider if you think your medicine is not helping or if you have side effects. Tell him of her if you are allergic to any medicine. Keep a list of the medicines, vitamins, and herbs you take. Include the amounts, and when and why you take them. Bring the list or the pill bottles to follow-up visits. Carry your medicine list with you in case of an emergency.      Manage your symptoms:   •Apply heat on your abdomen for 20 to 30 minutes every 2 hours for as many days as directed. Heat helps decrease pain and muscle spasms.      •Make changes to the food you eat, if needed. Do not eat foods that cause abdominal pain or other symptoms. Eat small meals more often. The following changes may also help:?Eat more high-fiber foods if you are constipated. High-fiber foods include fruits, vegetables, whole-grain foods, and legumes.             ?Do not eat foods that cause gas if you have bloating. Examples include broccoli, cabbage, and cauliflower. Do not drink soda or carbonated drinks. These may also cause gas.      ?Do not eat foods or drinks that contain sorbitol or fructose if you have diarrhea and bloating. Some examples are fruit juices, candy, jelly, and sugar-free gum.      ?Do not eat high-fat foods. Examples include fried foods, cheeseburgers, hot dogs, and desserts.      ?Limit or do not have caffeine. Caffeine may make symptoms such as heartburn or nausea worse.      ?Drink more liquids to prevent dehydration from diarrhea or vomiting. Ask your healthcare provider how much liquid to drink each day and which liquids are best for you.      •Keep a diary of your abdominal pain. A diary may help your healthcare provider learn what is causing your abdominal pain. Include when the pain happens, how long it lasts, and what the pain feels like. Write down any other symptoms you have with abdominal pain. Also write down what you eat, and what symptoms you have after you eat.      •Manage your stress. Stress may cause abdominal pain. Your healthcare provider may recommend relaxation techniques and deep breathing exercises to help decrease your stress. Your healthcare provider may recommend you talk to someone about your stress or anxiety, such as a counselor or a trusted friend. Get plenty of sleep and exercise regularly.  [employer] for Exercise           •Limit or do not drink alcohol. Alcohol can make your abdominal pain worse. Ask your healthcare provider if it is safe for you to drink alcohol. Also ask how much is safe for you to drink.      •Do not smoke. Nicotine and other chemicals in cigarettes can damage your esophagus and stomach. Ask your healthcare provider for information if you currently smoke and need help to quit. E-cigarettes or smokeless tobacco still contain nicotine. Talk to your healthcare provider before you use these products.      Follow up with your doctor within 24 hours or as directed: Write down your questions so you remember to ask them during your visits.

## 2022-02-15 VITALS
SYSTOLIC BLOOD PRESSURE: 123 MMHG | HEART RATE: 67 BPM | RESPIRATION RATE: 18 BRPM | TEMPERATURE: 98 F | DIASTOLIC BLOOD PRESSURE: 75 MMHG | OXYGEN SATURATION: 97 %

## 2022-02-15 PROBLEM — Z78.9 OTHER SPECIFIED HEALTH STATUS: Chronic | Status: ACTIVE | Noted: 2022-01-18

## 2022-02-16 LAB
CULTURE RESULTS: NO GROWTH — SIGNIFICANT CHANGE UP
SPECIMEN SOURCE: SIGNIFICANT CHANGE UP

## 2022-03-21 ENCOUNTER — EMERGENCY (EMERGENCY)
Facility: HOSPITAL | Age: 32
LOS: 1 days | Discharge: HOME | End: 2022-03-21
Attending: STUDENT IN AN ORGANIZED HEALTH CARE EDUCATION/TRAINING PROGRAM
Payer: MEDICAID

## 2022-03-21 VITALS
HEIGHT: 75 IN | SYSTOLIC BLOOD PRESSURE: 115 MMHG | RESPIRATION RATE: 18 BRPM | HEART RATE: 90 BPM | DIASTOLIC BLOOD PRESSURE: 60 MMHG | TEMPERATURE: 98 F | OXYGEN SATURATION: 98 % | WEIGHT: 315 LBS

## 2022-03-21 VITALS — TEMPERATURE: 98 F

## 2022-03-21 DIAGNOSIS — Z90.49 ACQUIRED ABSENCE OF OTHER SPECIFIED PARTS OF DIGESTIVE TRACT: Chronic | ICD-10-CM

## 2022-03-21 LAB
ALBUMIN SERPL ELPH-MCNC: 3.8 G/DL — SIGNIFICANT CHANGE UP (ref 3.5–5)
ALP SERPL-CCNC: 39 U/L — LOW (ref 40–120)
ALT FLD-CCNC: 34 U/L DA — SIGNIFICANT CHANGE UP (ref 10–60)
ANION GAP SERPL CALC-SCNC: 5 MMOL/L — SIGNIFICANT CHANGE UP (ref 5–17)
AST SERPL-CCNC: 29 U/L — SIGNIFICANT CHANGE UP (ref 10–40)
BASOPHILS # BLD AUTO: 0.04 K/UL — SIGNIFICANT CHANGE UP (ref 0–0.2)
BASOPHILS NFR BLD AUTO: 0.7 % — SIGNIFICANT CHANGE UP (ref 0–2)
BILIRUB SERPL-MCNC: 0.7 MG/DL — SIGNIFICANT CHANGE UP (ref 0.2–1.2)
BUN SERPL-MCNC: 11 MG/DL — SIGNIFICANT CHANGE UP (ref 7–18)
CALCIUM SERPL-MCNC: 8.7 MG/DL — SIGNIFICANT CHANGE UP (ref 8.4–10.5)
CHLORIDE SERPL-SCNC: 107 MMOL/L — SIGNIFICANT CHANGE UP (ref 96–108)
CO2 SERPL-SCNC: 26 MMOL/L — SIGNIFICANT CHANGE UP (ref 22–31)
CREAT SERPL-MCNC: 1.23 MG/DL — SIGNIFICANT CHANGE UP (ref 0.5–1.3)
EGFR: 80 ML/MIN/1.73M2 — SIGNIFICANT CHANGE UP
EOSINOPHIL # BLD AUTO: 0.24 K/UL — SIGNIFICANT CHANGE UP (ref 0–0.5)
EOSINOPHIL NFR BLD AUTO: 3.9 % — SIGNIFICANT CHANGE UP (ref 0–6)
GLUCOSE SERPL-MCNC: 93 MG/DL — SIGNIFICANT CHANGE UP (ref 70–99)
HCT VFR BLD CALC: 44 % — SIGNIFICANT CHANGE UP (ref 39–50)
HGB BLD-MCNC: 14.4 G/DL — SIGNIFICANT CHANGE UP (ref 13–17)
IMM GRANULOCYTES NFR BLD AUTO: 0.2 % — SIGNIFICANT CHANGE UP (ref 0–1.5)
LIDOCAIN IGE QN: 103 U/L — SIGNIFICANT CHANGE UP (ref 73–393)
LYMPHOCYTES # BLD AUTO: 2.5 K/UL — SIGNIFICANT CHANGE UP (ref 1–3.3)
LYMPHOCYTES # BLD AUTO: 40.7 % — SIGNIFICANT CHANGE UP (ref 13–44)
MCHC RBC-ENTMCNC: 28 PG — SIGNIFICANT CHANGE UP (ref 27–34)
MCHC RBC-ENTMCNC: 32.7 GM/DL — SIGNIFICANT CHANGE UP (ref 32–36)
MCV RBC AUTO: 85.4 FL — SIGNIFICANT CHANGE UP (ref 80–100)
MONOCYTES # BLD AUTO: 0.51 K/UL — SIGNIFICANT CHANGE UP (ref 0–0.9)
MONOCYTES NFR BLD AUTO: 8.3 % — SIGNIFICANT CHANGE UP (ref 2–14)
NEUTROPHILS # BLD AUTO: 2.85 K/UL — SIGNIFICANT CHANGE UP (ref 1.8–7.4)
NEUTROPHILS NFR BLD AUTO: 46.2 % — SIGNIFICANT CHANGE UP (ref 43–77)
NRBC # BLD: 0 /100 WBCS — SIGNIFICANT CHANGE UP (ref 0–0)
PLATELET # BLD AUTO: 313 K/UL — SIGNIFICANT CHANGE UP (ref 150–400)
POTASSIUM SERPL-MCNC: 3.3 MMOL/L — LOW (ref 3.5–5.3)
POTASSIUM SERPL-SCNC: 3.3 MMOL/L — LOW (ref 3.5–5.3)
PROT SERPL-MCNC: 8 G/DL — SIGNIFICANT CHANGE UP (ref 6–8.3)
RBC # BLD: 5.15 M/UL — SIGNIFICANT CHANGE UP (ref 4.2–5.8)
RBC # FLD: 14 % — SIGNIFICANT CHANGE UP (ref 10.3–14.5)
SODIUM SERPL-SCNC: 138 MMOL/L — SIGNIFICANT CHANGE UP (ref 135–145)
WBC # BLD: 6.15 K/UL — SIGNIFICANT CHANGE UP (ref 3.8–10.5)
WBC # FLD AUTO: 6.15 K/UL — SIGNIFICANT CHANGE UP (ref 3.8–10.5)

## 2022-03-21 PROCEDURE — 96374 THER/PROPH/DIAG INJ IV PUSH: CPT | Mod: XU

## 2022-03-21 PROCEDURE — 99284 EMERGENCY DEPT VISIT MOD MDM: CPT | Mod: 25

## 2022-03-21 PROCEDURE — 85025 COMPLETE CBC W/AUTO DIFF WBC: CPT

## 2022-03-21 PROCEDURE — 83690 ASSAY OF LIPASE: CPT

## 2022-03-21 PROCEDURE — 80053 COMPREHEN METABOLIC PANEL: CPT

## 2022-03-21 PROCEDURE — 74177 CT ABD & PELVIS W/CONTRAST: CPT | Mod: MA

## 2022-03-21 PROCEDURE — 74177 CT ABD & PELVIS W/CONTRAST: CPT | Mod: 26,MA

## 2022-03-21 PROCEDURE — 36415 COLL VENOUS BLD VENIPUNCTURE: CPT

## 2022-03-21 PROCEDURE — 99285 EMERGENCY DEPT VISIT HI MDM: CPT

## 2022-03-21 RX ORDER — SODIUM CHLORIDE 9 MG/ML
1000 INJECTION INTRAMUSCULAR; INTRAVENOUS; SUBCUTANEOUS ONCE
Refills: 0 | Status: COMPLETED | OUTPATIENT
Start: 2022-03-21 | End: 2022-03-21

## 2022-03-21 RX ORDER — MORPHINE SULFATE 50 MG/1
4 CAPSULE, EXTENDED RELEASE ORAL ONCE
Refills: 0 | Status: DISCONTINUED | OUTPATIENT
Start: 2022-03-21 | End: 2022-03-21

## 2022-03-21 RX ADMIN — MORPHINE SULFATE 4 MILLIGRAM(S): 50 CAPSULE, EXTENDED RELEASE ORAL at 18:25

## 2022-03-21 RX ADMIN — MORPHINE SULFATE 4 MILLIGRAM(S): 50 CAPSULE, EXTENDED RELEASE ORAL at 17:54

## 2022-03-21 RX ADMIN — SODIUM CHLORIDE 1000 MILLILITER(S): 9 INJECTION INTRAMUSCULAR; INTRAVENOUS; SUBCUTANEOUS at 17:54

## 2022-03-21 NOTE — ED PROVIDER NOTE - OBJECTIVE STATEMENT
32 y/o male, w/ PMHx/PSHx including lap ángel on 1/21/22, presents w/ severe RUQ pain and diarrhea for the past 4 hours. Reports similar pain intermittently over the past couple of weeks. Was here for similar symptoms on 2/14/22 at which time CT was unremarkable and patient was discharged. Denies any other symptoms including vomiting, fever, chest pain, shortness of breath, urinary symptoms. NKDA. 30 y/o male, w/ PMHx/PSHx including lap ángel on 1/21/22, presents w/ RUQ pain and diarrhea for the past 4 hours. Reports similar pain intermittently over the past couple of weeks. Was here for similar symptoms on 2/14/22 at which time CT was unremarkable and patient was discharged. Denies any other symptoms including vomiting, fever, chest pain, shortness of breath, urinary symptoms. NKDA.

## 2022-03-21 NOTE — ED PROVIDER NOTE - PROGRESS NOTE DETAILS
Pt feeling well, denies any further symptoms, tolerating po. Will f/u with his surgeon. Strict return precautions given.

## 2022-03-21 NOTE — ED PROVIDER NOTE - NSFOLLOWUPINSTRUCTIONS_ED_ALL_ED_FT
Follow up with your PCP and surgeon in 24-48 hours.   May take Tylenol and Motrin as directed on the bottle for pain control.   Return to the ER if you develop any new or worsening symptoms such as fever, worsening abdominal pain, chest pain, shortness of breath, numbness, weakness, abdominal pain, nausea, vomiting, or visual changes.

## 2022-03-21 NOTE — ED PROVIDER NOTE - CHPI ED SYMPTOMS NEG
no chest pain, no shortness of breath/no dysuria/no fever/no hematuria/no vomiting/no burning urination

## 2022-03-21 NOTE — ED PROVIDER NOTE - PATIENT PORTAL LINK FT
You can access the FollowMyHealth Patient Portal offered by [facility] by registering at the following website: [email or web address]. By joining [employer]’s FollowMyHealth portal, you will also be able to view your health information using other applications (apps) compatible with our system.

## 2022-03-21 NOTE — ED PROVIDER NOTE - CLINICAL SUMMARY MEDICAL DECISION MAKING FREE TEXT BOX
Severe RUQ pain and diarrhea in the setting of recent cholecystectomy. Labs, CT, reassess. RUQ pain and diarrhea in the setting of recent cholecystectomy. Labs, CT, reassess.

## 2022-03-21 NOTE — ED PROVIDER NOTE - PHYSICAL EXAMINATION
Abdominal: moderate RUQ and epigastric tenderness, no rebound or guarding, well-healing laparoscopic scars

## 2022-03-22 ENCOUNTER — EMERGENCY (EMERGENCY)
Facility: HOSPITAL | Age: 32
LOS: 1 days | Discharge: HOME | End: 2022-03-22
Attending: STUDENT IN AN ORGANIZED HEALTH CARE EDUCATION/TRAINING PROGRAM
Payer: MEDICAID

## 2022-03-22 VITALS
OXYGEN SATURATION: 99 % | HEART RATE: 84 BPM | WEIGHT: 315 LBS | TEMPERATURE: 98 F | RESPIRATION RATE: 18 BRPM | SYSTOLIC BLOOD PRESSURE: 142 MMHG | DIASTOLIC BLOOD PRESSURE: 83 MMHG | HEIGHT: 75 IN

## 2022-03-22 DIAGNOSIS — Z90.49 ACQUIRED ABSENCE OF OTHER SPECIFIED PARTS OF DIGESTIVE TRACT: Chronic | ICD-10-CM

## 2022-03-22 LAB
ALBUMIN SERPL ELPH-MCNC: 3.9 G/DL — SIGNIFICANT CHANGE UP (ref 3.5–5)
ALP SERPL-CCNC: 46 U/L — SIGNIFICANT CHANGE UP (ref 40–120)
ALT FLD-CCNC: 44 U/L DA — SIGNIFICANT CHANGE UP (ref 10–60)
ANION GAP SERPL CALC-SCNC: 3 MMOL/L — LOW (ref 5–17)
APPEARANCE UR: CLEAR — SIGNIFICANT CHANGE UP
AST SERPL-CCNC: 42 U/L — HIGH (ref 10–40)
BACTERIA # UR AUTO: ABNORMAL /HPF
BASOPHILS # BLD AUTO: 0.02 K/UL — SIGNIFICANT CHANGE UP (ref 0–0.2)
BASOPHILS NFR BLD AUTO: 0.3 % — SIGNIFICANT CHANGE UP (ref 0–2)
BILIRUB SERPL-MCNC: 0.8 MG/DL — SIGNIFICANT CHANGE UP (ref 0.2–1.2)
BILIRUB UR-MCNC: NEGATIVE — SIGNIFICANT CHANGE UP
BUN SERPL-MCNC: 9 MG/DL — SIGNIFICANT CHANGE UP (ref 7–18)
CALCIUM SERPL-MCNC: 8.9 MG/DL — SIGNIFICANT CHANGE UP (ref 8.4–10.5)
CHLORIDE SERPL-SCNC: 106 MMOL/L — SIGNIFICANT CHANGE UP (ref 96–108)
CO2 SERPL-SCNC: 27 MMOL/L — SIGNIFICANT CHANGE UP (ref 22–31)
COLOR SPEC: YELLOW — SIGNIFICANT CHANGE UP
COMMENT - URINE: SIGNIFICANT CHANGE UP
CREAT SERPL-MCNC: 1.39 MG/DL — HIGH (ref 0.5–1.3)
DIFF PNL FLD: ABNORMAL
EGFR: 69 ML/MIN/1.73M2 — SIGNIFICANT CHANGE UP
EOSINOPHIL # BLD AUTO: 0.15 K/UL — SIGNIFICANT CHANGE UP (ref 0–0.5)
EOSINOPHIL NFR BLD AUTO: 2.3 % — SIGNIFICANT CHANGE UP (ref 0–6)
EPI CELLS # UR: SIGNIFICANT CHANGE UP /HPF
GLUCOSE SERPL-MCNC: 109 MG/DL — HIGH (ref 70–99)
GLUCOSE UR QL: NEGATIVE — SIGNIFICANT CHANGE UP
HCT VFR BLD CALC: 45.4 % — SIGNIFICANT CHANGE UP (ref 39–50)
HGB BLD-MCNC: 14.7 G/DL — SIGNIFICANT CHANGE UP (ref 13–17)
IMM GRANULOCYTES NFR BLD AUTO: 0.5 % — SIGNIFICANT CHANGE UP (ref 0–1.5)
KETONES UR-MCNC: NEGATIVE — SIGNIFICANT CHANGE UP
LEUKOCYTE ESTERASE UR-ACNC: NEGATIVE — SIGNIFICANT CHANGE UP
LIDOCAIN IGE QN: 246 U/L — SIGNIFICANT CHANGE UP (ref 73–393)
LYMPHOCYTES # BLD AUTO: 2.38 K/UL — SIGNIFICANT CHANGE UP (ref 1–3.3)
LYMPHOCYTES # BLD AUTO: 36.6 % — SIGNIFICANT CHANGE UP (ref 13–44)
MCHC RBC-ENTMCNC: 28.3 PG — SIGNIFICANT CHANGE UP (ref 27–34)
MCHC RBC-ENTMCNC: 32.4 GM/DL — SIGNIFICANT CHANGE UP (ref 32–36)
MCV RBC AUTO: 87.3 FL — SIGNIFICANT CHANGE UP (ref 80–100)
MONOCYTES # BLD AUTO: 0.45 K/UL — SIGNIFICANT CHANGE UP (ref 0–0.9)
MONOCYTES NFR BLD AUTO: 6.9 % — SIGNIFICANT CHANGE UP (ref 2–14)
NEUTROPHILS # BLD AUTO: 3.47 K/UL — SIGNIFICANT CHANGE UP (ref 1.8–7.4)
NEUTROPHILS NFR BLD AUTO: 53.4 % — SIGNIFICANT CHANGE UP (ref 43–77)
NITRITE UR-MCNC: NEGATIVE — SIGNIFICANT CHANGE UP
NRBC # BLD: 0 /100 WBCS — SIGNIFICANT CHANGE UP (ref 0–0)
PH UR: 6 — SIGNIFICANT CHANGE UP (ref 5–8)
PHOSPHATE SERPL-MCNC: 3.1 MG/DL — SIGNIFICANT CHANGE UP (ref 2.5–4.5)
PLATELET # BLD AUTO: 308 K/UL — SIGNIFICANT CHANGE UP (ref 150–400)
POTASSIUM SERPL-MCNC: 3.8 MMOL/L — SIGNIFICANT CHANGE UP (ref 3.5–5.3)
POTASSIUM SERPL-SCNC: 3.8 MMOL/L — SIGNIFICANT CHANGE UP (ref 3.5–5.3)
PROT SERPL-MCNC: 8 G/DL — SIGNIFICANT CHANGE UP (ref 6–8.3)
PROT UR-MCNC: NEGATIVE — SIGNIFICANT CHANGE UP
RBC # BLD: 5.2 M/UL — SIGNIFICANT CHANGE UP (ref 4.2–5.8)
RBC # FLD: 14.1 % — SIGNIFICANT CHANGE UP (ref 10.3–14.5)
RBC CASTS # UR COMP ASSIST: ABNORMAL /HPF (ref 0–2)
SODIUM SERPL-SCNC: 136 MMOL/L — SIGNIFICANT CHANGE UP (ref 135–145)
SP GR SPEC: 1.01 — SIGNIFICANT CHANGE UP (ref 1.01–1.02)
UROBILINOGEN FLD QL: NEGATIVE — SIGNIFICANT CHANGE UP
WBC # BLD: 6.5 K/UL — SIGNIFICANT CHANGE UP (ref 3.8–10.5)
WBC # FLD AUTO: 6.5 K/UL — SIGNIFICANT CHANGE UP (ref 3.8–10.5)
WBC UR QL: SIGNIFICANT CHANGE UP /HPF (ref 0–5)

## 2022-03-22 PROCEDURE — 87086 URINE CULTURE/COLONY COUNT: CPT

## 2022-03-22 PROCEDURE — 96375 TX/PRO/DX INJ NEW DRUG ADDON: CPT

## 2022-03-22 PROCEDURE — 85025 COMPLETE CBC W/AUTO DIFF WBC: CPT

## 2022-03-22 PROCEDURE — 99284 EMERGENCY DEPT VISIT MOD MDM: CPT

## 2022-03-22 PROCEDURE — 80053 COMPREHEN METABOLIC PANEL: CPT

## 2022-03-22 PROCEDURE — 84100 ASSAY OF PHOSPHORUS: CPT

## 2022-03-22 PROCEDURE — 83735 ASSAY OF MAGNESIUM: CPT

## 2022-03-22 PROCEDURE — 96374 THER/PROPH/DIAG INJ IV PUSH: CPT

## 2022-03-22 PROCEDURE — 83690 ASSAY OF LIPASE: CPT

## 2022-03-22 PROCEDURE — 99284 EMERGENCY DEPT VISIT MOD MDM: CPT | Mod: 25

## 2022-03-22 PROCEDURE — 81001 URINALYSIS AUTO W/SCOPE: CPT

## 2022-03-22 PROCEDURE — 36415 COLL VENOUS BLD VENIPUNCTURE: CPT

## 2022-03-22 RX ORDER — ONDANSETRON 8 MG/1
4 TABLET, FILM COATED ORAL ONCE
Refills: 0 | Status: COMPLETED | OUTPATIENT
Start: 2022-03-22 | End: 2022-03-22

## 2022-03-22 RX ORDER — KETOROLAC TROMETHAMINE 30 MG/ML
30 SYRINGE (ML) INJECTION ONCE
Refills: 0 | Status: DISCONTINUED | OUTPATIENT
Start: 2022-03-22 | End: 2022-03-22

## 2022-03-22 RX ORDER — FAMOTIDINE 10 MG/ML
20 INJECTION INTRAVENOUS ONCE
Refills: 0 | Status: COMPLETED | OUTPATIENT
Start: 2022-03-22 | End: 2022-03-22

## 2022-03-22 RX ORDER — SODIUM CHLORIDE 9 MG/ML
1000 INJECTION INTRAMUSCULAR; INTRAVENOUS; SUBCUTANEOUS ONCE
Refills: 0 | Status: COMPLETED | OUTPATIENT
Start: 2022-03-22 | End: 2022-03-22

## 2022-03-22 RX ADMIN — FAMOTIDINE 20 MILLIGRAM(S): 10 INJECTION INTRAVENOUS at 15:35

## 2022-03-22 RX ADMIN — SODIUM CHLORIDE 1000 MILLILITER(S): 9 INJECTION INTRAMUSCULAR; INTRAVENOUS; SUBCUTANEOUS at 15:35

## 2022-03-22 RX ADMIN — ONDANSETRON 4 MILLIGRAM(S): 8 TABLET, FILM COATED ORAL at 17:49

## 2022-03-22 RX ADMIN — Medication 30 MILLILITER(S): at 15:34

## 2022-03-22 RX ADMIN — Medication 30 MILLIGRAM(S): at 15:48

## 2022-03-22 NOTE — ED PROVIDER NOTE - PHYSICAL EXAMINATION
General: uncomfortable appearing male, no acute distress   HEENT: normocephalic, atraumatic   Respiratory: normal work of breathing, lungs clear to auscultation bilaterally   Cardiac: regular rate and rhythm   Abdomen: soft, epigastric and ruq abdominal pain  MSK: no swelling or tenderness of lower extremities, moving all extremities spontaneously   Skin: warm, dry   Neuro: A&Ox3  Psych: appropriate affect

## 2022-03-22 NOTE — ED PROVIDER NOTE - NSFOLLOWUPINSTRUCTIONS_ED_ALL_ED_FT
You were seen in the emergency department for abdominal pain.     Please follow-up with your surgeon as previously scheduled.     Please take Ibuprofen and Tylenol as prescribed on the bottles for pain control.     If you have any worsening symptoms, severe abdominal pain, chest pain, shortness of breath, please return ot the emergency department. You were seen in the emergency department for abdominal pain.     Please follow-up with your surgeon as previously scheduled.     Please follow-up with a gastroenterologist within the next 1-2 weeks.     Please take Ibuprofen and Tylenol as prescribed on the bottles for pain control.     If you have any worsening symptoms, severe abdominal pain, chest pain, shortness of breath, please return ot the emergency department.

## 2022-03-22 NOTE — CONSULT NOTE ADULT - SUBJECTIVE AND OBJECTIVE BOX
31M, no significant pmh, presenting with abdominal pain, nausea and vomiting. patient had lap ángel in january and since then he has been having these symptoms. was seen in ED yesterday with workup revealing likely colitis. returns today because he still has pain. plans to see his surgeon in three days. no fever, chest pain or shortness of breathe    SURGICAL CONSULTATION:   Pt seen and examined at bedside. Presents complaining of epigastric, RUQ/Right flank pain x 1 month. Pt states the pain worsened over the last 48 hours. Now associated with nausea/vomiting and diarrhea. Pt underwent laparoscopic cholecystectomy  with Dr. Mckinney. Pt with multiple visit to ED for abdominal pain. Presenting yesterday CT showed colitis, returns today given worsening of symptoms Currently pt states abdominal pain has improved with pain medication and nausea has resolved. Denied fever, chills, or recent travel.     PAST MEDICAL & SURGICAL HISTORY:  No pertinent past medical history  History of laparoscopic cholecystectomy    Allergies    No Known Allergies    Intolerances    Vital Signs Last 24 Hrs  T(C): 36.6 (22 Mar 2022 14:35), Max: 36.7 (21 Mar 2022 20:57)  T(F): 97.8 (22 Mar 2022 14:35), Max: 98.1 (21 Mar 2022 20:57)  HR: 84 (22 Mar 2022 14:35) (84 - 84)  BP: 142/83 (22 Mar 2022 14:35) (142/83 - 142/83)  RR: 18 (22 Mar 2022 14:35) (18 - 18)  SpO2: 99% (22 Mar 2022 14:35) (99% - 99%)    Physical:  Gen: A&Ox3. NAD  Chest: respiration unlabored  Abd: Soft, ND, +RUQ tenderness and epigastric tenderness overlying incision sites. No guarding.     LABS:                        14.7   6.50  )-----------( 308      ( 22 Mar 2022 15:51 )             45.4              03-    136  |  106  |  9   ----------------------------<  109<H>  3.8   |  27  |  1.39<H>    Ca    8.9      22 Mar 2022 15:51  Phos  3.1     03-  Mg     2.0     -22    TPro  8.0  /  Alb  3.9  /  TBili  0.8  /  DBili  x   /  AST  42<H>  /  ALT  44  /  AlkPhos  46                Urinalysis Basic - ( 22 Mar 2022 15:51 )    Color: Yellow / Appearance: Clear / S.010 / pH: x  Gluc: x / Ketone: Negative  / Bili: Negative / Urobili: Negative   Blood: x / Protein: Negative / Nitrite: Negative   Leuk Esterase: Negative / RBC: 2-5 /HPF / WBC 0-2 /HPF   Sq Epi: x / Non Sq Epi: Few /HPF / Bacteria: Trace /HPF        RADIOLOGY & ADDITIONAL STUDIES:    < from: CT Abdomen and Pelvis w/ IV Cont (22 @ 19:39) >  ACC: [id number] EXAM:  CT ABDOMEN AND PELVIS IC                          PROCEDURE DATE:  2022          INTERPRETATION:  CLINICAL INFORMATION: 31 years  Male with severe RUQ   pain, recent cholecystectomy  2022.    COMPARISON: 2022    CONTRAST/COMPLICATIONS:  IV Contrast: Omnipaque 350  90 cc administered   10 cc discarded  Oral Contrast: NONE  Complications: None reported at time of study completion    PROCEDURE:  CT of the Abdomen and Pelvis was performed.  Sagittal and coronal reformats were performed.    FINDINGS:  LOWER CHEST: Within normal limits.    LIVER: Steatosis..  BILE DUCTS: Common duct 9 mm, unchanged.  GALLBLADDER: Cholecystectomy. No fluid collection in the gallbladder   fossa.  SPLEEN: Within normal limits.  PANCREAS: Within normal limits.  ADRENALS: Within normal limits.  KIDNEYS/URETERS: Within normal limits.    BLADDER: Within normal limits.  REPRODUCTIVE ORGANS: Prostate within normal limits.    BOWEL: No bowel obstruction. Mild diffuse colonic wall thickening.   Appendix is not visualized. No evidence of inflammation in the pericecal   region.  PERITONEUM: No ascites.  VESSELS: Within normal limits.  RETROPERITONEUM/LYMPH NODES: No lymphadenopathy.  ABDOMINAL WALL: Postoperative changes.  BONES: Within normal limits.    IMPRESSION:  Mild diffuse colonic wall thickening secondary to under distention or   nonspecific colitis.    --- End of Report ---      MIHIR PAUL MD; Attending Radiologist  This document has been electronically signed. Mar 21 2022  7:51PM    < end of copied text >

## 2022-03-22 NOTE — ED PROVIDER NOTE - PROGRESS NOTE DETAILS
patient updated on results. evaluated by surgery PA. symptoms improved. will discharge with surgery and GI follow-up. Dennis Chauhan

## 2022-03-22 NOTE — ED ADULT NURSE NOTE - OBJECTIVE STATEMENT
pt is a  30 y/o male w/ c/o vomiting and diarrhea . pt  states  he also  still not eating  , every time he eats he  vomits. abdomen soft non distended + BS noted.

## 2022-03-22 NOTE — ED PROVIDER NOTE - OBJECTIVE STATEMENT
31M, no significant pmh, presenting with abdominal pain, nausea and vomiting. patient had lap ángel in january and since then he has been having these symptoms. was seen in ED yesterday with workup revealing likely colitis. returns today because he still has pain. plans to see his surgeon in three days. no fever, chest pain or shortness of breath.

## 2022-03-22 NOTE — ED PROVIDER NOTE - PATIENT PORTAL LINK FT
You can access the FollowMyHealth Patient Portal offered by [facility] by registering at the following website: [email or web address]. By joining [employer] [facility]’s FollowMyHealth portal, you will also be able to view your health information using other applications (apps) compatible with our system.

## 2022-03-22 NOTE — ED PROVIDER NOTE - CLINICAL SUMMARY MEDICAL DECISION MAKING FREE TEXT BOX
31M presenting with abdominal pain. seen yesterday for similar symptoms and Ct was negative for any emergent findings. will repeat blood work, pain control. surgery consulted and evaluated patient.

## 2022-03-22 NOTE — ED ADULT NURSE NOTE - NSIMPLEMENTINTERV_GEN_ALL_ED
Implemented All Universal Safety Interventions:  [place] to call system. Call bell, personal items and telephone within reach. Instruct patient to call for assistance. Room bathroom lighting operational. Non-slip footwear when patient is off stretcher. Physically safe environment: no spills, clutter or unnecessary equipment. Stretcher in lowest position, wheels locked, appropriate side rails in place.

## 2022-03-22 NOTE — CONSULT NOTE ADULT - ASSESSMENT
30 y/o male s/p lap ángel 1/21 presenting with abdominal pain associated with nausea/vomiting  found to have colitis    no leukocytosis, afebrile  LFTs wnl  THEA    No acute surgical intervention  Recommend IV hydration  Pain control PRN   D/w Dr. Mckinney

## 2022-03-23 LAB
CULTURE RESULTS: NO GROWTH — SIGNIFICANT CHANGE UP
SPECIMEN SOURCE: SIGNIFICANT CHANGE UP

## 2023-11-30 NOTE — ED ADULT NURSE NOTE - BREATH SOUNDS, MLM
Azalea presents today for her OB visit.  Pt c/o of nausea - already on meds but worse with antibiotics - Pt  wondering since pt will be on antibiotics rest of pregnancy if she should still be on probiotics-     Patient would like communication of their results via: [employer]    Patient's current [facility] status: Active.     Chaperone present during sensitive exam: No, patient declined chaperone.    Baby Scripts - Patient is on [employer] Scheduling.        
Routine OB 16w1d  Doing well.     Encounter for supervision of normal first pregnancy in first trimester  Genetics:  NEG  Questions answered.    Encouraged hydration  Labor plans - unmedicated, reviewed history, has no affect from tylenol or ibuprofen, reacts poorly to narcotics - consider anesthesia consult  RTC in 4 weeks for MFM anatomy US/ob check  2. Anxiety and depression/PTSD  Has a service dog, avoiding bringing as appts cause some anxiety and her dog is always on high alert. Discussed today that if needed she should bring the dog.  Started on Celexa at FOB, was on for 2 weeks, caused severe n/v, stopped by psychiatrist and was discharged, now seeing therapist  On Zoloft in the past, had no affect until atg 200 mg, then felt numb.  Doing well currently now that she was feeling better  Discussed considering 3rd trimester management.  3.  Acute cystitis with possible R Pyelonephritis   Given Rocephin in UC and Keflex - had allergic reaction to the Keflex was a rash and felt ill.  Admitted with IV Rocephin. Now on day 5 of Bactrim - moderate n/v, taking scheduled Zofran, severe constipation - recommend starting Miralax  Kidney US with possible hydronephrosis  Consulted with Dr Espitia - after discussion, decided to hold on prophylactic abx. Will rpt culture in 1 month.      
Clear

## 2023-12-13 ENCOUNTER — EMERGENCY (EMERGENCY)
Facility: HOSPITAL | Age: 33
LOS: 1 days | Discharge: HOME | End: 2023-12-13
Attending: EMERGENCY MEDICINE
Payer: MEDICAID

## 2023-12-13 VITALS
TEMPERATURE: 99 F | DIASTOLIC BLOOD PRESSURE: 92 MMHG | SYSTOLIC BLOOD PRESSURE: 152 MMHG | OXYGEN SATURATION: 97 % | RESPIRATION RATE: 18 BRPM | HEART RATE: 80 BPM | WEIGHT: 315 LBS | HEIGHT: 75 IN

## 2023-12-13 VITALS
SYSTOLIC BLOOD PRESSURE: 144 MMHG | DIASTOLIC BLOOD PRESSURE: 82 MMHG | RESPIRATION RATE: 18 BRPM | TEMPERATURE: 98 F | OXYGEN SATURATION: 98 % | HEART RATE: 82 BPM

## 2023-12-13 DIAGNOSIS — Z90.49 ACQUIRED ABSENCE OF OTHER SPECIFIED PARTS OF DIGESTIVE TRACT: Chronic | ICD-10-CM

## 2023-12-13 LAB
ALBUMIN SERPL ELPH-MCNC: 3.8 G/DL — SIGNIFICANT CHANGE UP (ref 3.5–5)
ALBUMIN SERPL ELPH-MCNC: 3.8 G/DL — SIGNIFICANT CHANGE UP (ref 3.5–5)
ALP SERPL-CCNC: 39 U/L — LOW (ref 40–120)
ALP SERPL-CCNC: 39 U/L — LOW (ref 40–120)
ALT FLD-CCNC: 51 U/L DA — SIGNIFICANT CHANGE UP (ref 10–60)
ALT FLD-CCNC: 51 U/L DA — SIGNIFICANT CHANGE UP (ref 10–60)
ANION GAP SERPL CALC-SCNC: 4 MMOL/L — LOW (ref 5–17)
ANION GAP SERPL CALC-SCNC: 4 MMOL/L — LOW (ref 5–17)
AST SERPL-CCNC: 43 U/L — HIGH (ref 10–40)
AST SERPL-CCNC: 43 U/L — HIGH (ref 10–40)
BASOPHILS # BLD AUTO: 0.02 K/UL — SIGNIFICANT CHANGE UP (ref 0–0.2)
BASOPHILS # BLD AUTO: 0.02 K/UL — SIGNIFICANT CHANGE UP (ref 0–0.2)
BASOPHILS NFR BLD AUTO: 0.5 % — SIGNIFICANT CHANGE UP (ref 0–2)
BASOPHILS NFR BLD AUTO: 0.5 % — SIGNIFICANT CHANGE UP (ref 0–2)
BILIRUB SERPL-MCNC: 0.4 MG/DL — SIGNIFICANT CHANGE UP (ref 0.2–1.2)
BILIRUB SERPL-MCNC: 0.4 MG/DL — SIGNIFICANT CHANGE UP (ref 0.2–1.2)
BUN SERPL-MCNC: 10 MG/DL — SIGNIFICANT CHANGE UP (ref 7–18)
BUN SERPL-MCNC: 10 MG/DL — SIGNIFICANT CHANGE UP (ref 7–18)
CALCIUM SERPL-MCNC: 8.1 MG/DL — LOW (ref 8.4–10.5)
CALCIUM SERPL-MCNC: 8.1 MG/DL — LOW (ref 8.4–10.5)
CHLORIDE SERPL-SCNC: 106 MMOL/L — SIGNIFICANT CHANGE UP (ref 96–108)
CHLORIDE SERPL-SCNC: 106 MMOL/L — SIGNIFICANT CHANGE UP (ref 96–108)
CO2 SERPL-SCNC: 27 MMOL/L — SIGNIFICANT CHANGE UP (ref 22–31)
CO2 SERPL-SCNC: 27 MMOL/L — SIGNIFICANT CHANGE UP (ref 22–31)
CREAT SERPL-MCNC: 1.09 MG/DL — SIGNIFICANT CHANGE UP (ref 0.5–1.3)
CREAT SERPL-MCNC: 1.09 MG/DL — SIGNIFICANT CHANGE UP (ref 0.5–1.3)
EGFR: 91 ML/MIN/1.73M2 — SIGNIFICANT CHANGE UP
EGFR: 91 ML/MIN/1.73M2 — SIGNIFICANT CHANGE UP
EOSINOPHIL # BLD AUTO: 0.15 K/UL — SIGNIFICANT CHANGE UP (ref 0–0.5)
EOSINOPHIL # BLD AUTO: 0.15 K/UL — SIGNIFICANT CHANGE UP (ref 0–0.5)
EOSINOPHIL NFR BLD AUTO: 3.8 % — SIGNIFICANT CHANGE UP (ref 0–6)
EOSINOPHIL NFR BLD AUTO: 3.8 % — SIGNIFICANT CHANGE UP (ref 0–6)
FLUAV AG NPH QL: SIGNIFICANT CHANGE UP
FLUAV AG NPH QL: SIGNIFICANT CHANGE UP
FLUBV AG NPH QL: DETECTED
FLUBV AG NPH QL: DETECTED
GLUCOSE SERPL-MCNC: 88 MG/DL — SIGNIFICANT CHANGE UP (ref 70–99)
GLUCOSE SERPL-MCNC: 88 MG/DL — SIGNIFICANT CHANGE UP (ref 70–99)
HCT VFR BLD CALC: 44 % — SIGNIFICANT CHANGE UP (ref 39–50)
HCT VFR BLD CALC: 44 % — SIGNIFICANT CHANGE UP (ref 39–50)
HGB BLD-MCNC: 14.2 G/DL — SIGNIFICANT CHANGE UP (ref 13–17)
HGB BLD-MCNC: 14.2 G/DL — SIGNIFICANT CHANGE UP (ref 13–17)
IMM GRANULOCYTES NFR BLD AUTO: 0 % — SIGNIFICANT CHANGE UP (ref 0–0.9)
IMM GRANULOCYTES NFR BLD AUTO: 0 % — SIGNIFICANT CHANGE UP (ref 0–0.9)
LYMPHOCYTES # BLD AUTO: 1.77 K/UL — SIGNIFICANT CHANGE UP (ref 1–3.3)
LYMPHOCYTES # BLD AUTO: 1.77 K/UL — SIGNIFICANT CHANGE UP (ref 1–3.3)
LYMPHOCYTES # BLD AUTO: 44.8 % — HIGH (ref 13–44)
LYMPHOCYTES # BLD AUTO: 44.8 % — HIGH (ref 13–44)
MCHC RBC-ENTMCNC: 28.1 PG — SIGNIFICANT CHANGE UP (ref 27–34)
MCHC RBC-ENTMCNC: 28.1 PG — SIGNIFICANT CHANGE UP (ref 27–34)
MCHC RBC-ENTMCNC: 32.3 GM/DL — SIGNIFICANT CHANGE UP (ref 32–36)
MCHC RBC-ENTMCNC: 32.3 GM/DL — SIGNIFICANT CHANGE UP (ref 32–36)
MCV RBC AUTO: 87 FL — SIGNIFICANT CHANGE UP (ref 80–100)
MCV RBC AUTO: 87 FL — SIGNIFICANT CHANGE UP (ref 80–100)
MONOCYTES # BLD AUTO: 0.41 K/UL — SIGNIFICANT CHANGE UP (ref 0–0.9)
MONOCYTES # BLD AUTO: 0.41 K/UL — SIGNIFICANT CHANGE UP (ref 0–0.9)
MONOCYTES NFR BLD AUTO: 10.4 % — SIGNIFICANT CHANGE UP (ref 2–14)
MONOCYTES NFR BLD AUTO: 10.4 % — SIGNIFICANT CHANGE UP (ref 2–14)
NEUTROPHILS # BLD AUTO: 1.6 K/UL — LOW (ref 1.8–7.4)
NEUTROPHILS # BLD AUTO: 1.6 K/UL — LOW (ref 1.8–7.4)
NEUTROPHILS NFR BLD AUTO: 40.5 % — LOW (ref 43–77)
NEUTROPHILS NFR BLD AUTO: 40.5 % — LOW (ref 43–77)
NRBC # BLD: 0 /100 WBCS — SIGNIFICANT CHANGE UP (ref 0–0)
NRBC # BLD: 0 /100 WBCS — SIGNIFICANT CHANGE UP (ref 0–0)
PLATELET # BLD AUTO: 258 K/UL — SIGNIFICANT CHANGE UP (ref 150–400)
PLATELET # BLD AUTO: 258 K/UL — SIGNIFICANT CHANGE UP (ref 150–400)
POTASSIUM SERPL-MCNC: 3.8 MMOL/L — SIGNIFICANT CHANGE UP (ref 3.5–5.3)
POTASSIUM SERPL-MCNC: 3.8 MMOL/L — SIGNIFICANT CHANGE UP (ref 3.5–5.3)
POTASSIUM SERPL-SCNC: 3.8 MMOL/L — SIGNIFICANT CHANGE UP (ref 3.5–5.3)
POTASSIUM SERPL-SCNC: 3.8 MMOL/L — SIGNIFICANT CHANGE UP (ref 3.5–5.3)
PROT SERPL-MCNC: 7.8 G/DL — SIGNIFICANT CHANGE UP (ref 6–8.3)
PROT SERPL-MCNC: 7.8 G/DL — SIGNIFICANT CHANGE UP (ref 6–8.3)
RBC # BLD: 5.06 M/UL — SIGNIFICANT CHANGE UP (ref 4.2–5.8)
RBC # BLD: 5.06 M/UL — SIGNIFICANT CHANGE UP (ref 4.2–5.8)
RBC # FLD: 13.2 % — SIGNIFICANT CHANGE UP (ref 10.3–14.5)
RBC # FLD: 13.2 % — SIGNIFICANT CHANGE UP (ref 10.3–14.5)
SARS-COV-2 RNA SPEC QL NAA+PROBE: SIGNIFICANT CHANGE UP
SARS-COV-2 RNA SPEC QL NAA+PROBE: SIGNIFICANT CHANGE UP
SODIUM SERPL-SCNC: 137 MMOL/L — SIGNIFICANT CHANGE UP (ref 135–145)
SODIUM SERPL-SCNC: 137 MMOL/L — SIGNIFICANT CHANGE UP (ref 135–145)
TROPONIN I, HIGH SENSITIVITY RESULT: 6.9 NG/L — SIGNIFICANT CHANGE UP
TROPONIN I, HIGH SENSITIVITY RESULT: 6.9 NG/L — SIGNIFICANT CHANGE UP
WBC # BLD: 3.95 K/UL — SIGNIFICANT CHANGE UP (ref 3.8–10.5)
WBC # BLD: 3.95 K/UL — SIGNIFICANT CHANGE UP (ref 3.8–10.5)
WBC # FLD AUTO: 3.95 K/UL — SIGNIFICANT CHANGE UP (ref 3.8–10.5)
WBC # FLD AUTO: 3.95 K/UL — SIGNIFICANT CHANGE UP (ref 3.8–10.5)

## 2023-12-13 PROCEDURE — 99284 EMERGENCY DEPT VISIT MOD MDM: CPT | Mod: 25

## 2023-12-13 PROCEDURE — 36415 COLL VENOUS BLD VENIPUNCTURE: CPT

## 2023-12-13 PROCEDURE — 71046 X-RAY EXAM CHEST 2 VIEWS: CPT

## 2023-12-13 PROCEDURE — 99285 EMERGENCY DEPT VISIT HI MDM: CPT

## 2023-12-13 PROCEDURE — 99285 EMERGENCY DEPT VISIT HI MDM: CPT | Mod: 25

## 2023-12-13 PROCEDURE — 85025 COMPLETE CBC W/AUTO DIFF WBC: CPT

## 2023-12-13 PROCEDURE — 96374 THER/PROPH/DIAG INJ IV PUSH: CPT

## 2023-12-13 PROCEDURE — 84484 ASSAY OF TROPONIN QUANT: CPT

## 2023-12-13 PROCEDURE — 87637 SARSCOV2&INF A&B&RSV AMP PRB: CPT

## 2023-12-13 PROCEDURE — 80053 COMPREHEN METABOLIC PANEL: CPT

## 2023-12-13 PROCEDURE — 93005 ELECTROCARDIOGRAM TRACING: CPT

## 2023-12-13 PROCEDURE — 71046 X-RAY EXAM CHEST 2 VIEWS: CPT | Mod: 26

## 2023-12-13 RX ORDER — KETOROLAC TROMETHAMINE 30 MG/ML
30 SYRINGE (ML) INJECTION ONCE
Refills: 0 | Status: DISCONTINUED | OUTPATIENT
Start: 2023-12-13 | End: 2023-12-13

## 2023-12-13 RX ORDER — SODIUM CHLORIDE 9 MG/ML
1000 INJECTION INTRAMUSCULAR; INTRAVENOUS; SUBCUTANEOUS ONCE
Refills: 0 | Status: COMPLETED | OUTPATIENT
Start: 2023-12-13 | End: 2023-12-13

## 2023-12-13 RX ADMIN — Medication 30 MILLIGRAM(S): at 21:42

## 2023-12-13 RX ADMIN — SODIUM CHLORIDE 1000 MILLILITER(S): 9 INJECTION INTRAMUSCULAR; INTRAVENOUS; SUBCUTANEOUS at 21:12

## 2023-12-13 RX ADMIN — Medication 30 MILLIGRAM(S): at 21:12

## 2023-12-13 NOTE — ED PROVIDER NOTE - NSFOLLOWUPINSTRUCTIONS_ED_ALL_ED_FT
Influenza    WHAT YOU NEED TO KNOW:    Influenza (the flu) is an infection caused by the influenza virus. The virus spreads through direct contact with someone who has the flu. For example, a person with the virus on his or her hands can spread it by shaking hands with someone. You may be able to spread the flu to others for 1 week or longer after signs or symptoms appear.    DISCHARGE INSTRUCTIONS:    Call your local emergency number (911 in the ) if:    You have trouble breathing, and your lips look purple or blue.    You have a seizure.  Seek care immediately if:    You are dizzy, or you are urinating less or not at all.    You have a headache with a stiff neck, and you feel tired or confused.    You have new pain or pressure in your chest.    Your symptoms, such as shortness of breath, vomiting, or diarrhea, get worse.    Your symptoms, such as fever and coughing, seem to get better, but then get worse.  Call your doctor if:    You have new muscle pain or weakness.    You have questions or concerns about your condition or care.  Medicines: You may need any of the following:    Acetaminophen decreases pain and fever. It is available without a doctor's order. Ask how much to take and how often to take it. Follow directions. Read the labels of all other medicines you are using to see if they also contain acetaminophen, or ask your doctor or pharmacist. Acetaminophen can cause liver damage if not taken correctly.    NSAIDs, such as ibuprofen, help decrease swelling, pain, and fever. This medicine is available with or without a doctor's order. NSAIDs can cause stomach bleeding or kidney problems in certain people. If you take blood thinner medicine, always ask your healthcare provider if NSAIDs are safe for you. Always read the medicine label and follow directions.    Antivirals help fight a viral infection.    Take your medicine as directed. Contact your healthcare provider if you think your medicine is not helping or if you have side effects. Tell your provider if you are allergic to any medicine. Keep a list of the medicines, vitamins, and herbs you take. Include the amounts, and when and why you take them. Bring the list or the pill bottles to follow-up visits. Carry your medicine list with you in case of an emergency.  Rest as much as you can to help you recover.    Drink liquids as directed to help prevent dehydration. Ask how much liquid to drink each day and which liquids are best for you.    Prevent the spread of germs:      Wash your hands often. Wash your hands several times each day. Wash after you use the bathroom, change a child's diaper, and before you prepare or eat food. Use soap and water every time. Rub your soapy hands together, lacing your fingers. Wash the front and back of your hands, and in between your fingers. Use the fingers of one hand to scrub under the fingernails of the other hand. Wash for at least 20 seconds. Rinse with warm, running water for several seconds. Then dry your hands with a clean towel or paper towel. Use hand  that contains alcohol if soap and water are not available. Do not touch your eyes, nose, or mouth without washing your hands first.  Handwashing      Cover a sneeze or cough. Use a tissue that covers your mouth and nose. Throw the tissue away in a trash can right away. Use the bend of your arm if a tissue is not available. Wash your hands well with soap and water or use a hand .    Stay away from others while you are sick. Avoid crowds as much as possible.    Ask about vaccines you may need. Talk to your healthcare provider about your vaccine history. Your provider can tell you which vaccines you need, and when to get them.  Get the influenza (flu) vaccine as soon as recommended. The vaccine is usually available starting in September or October. Flu viruses change, so it is important to get a flu vaccine every year.    Get the pneumonia vaccine if recommended. This vaccine is usually recommended every 5 years. Your provider will tell you when to get this vaccine, if needed.  Follow up with your doctor as directed: Write down your questions so you remember to ask them during your visits.    © [employer] 1973, 2023    	  back to top            © [employer] 1973, 2023

## 2023-12-13 NOTE — ED ADULT NURSE NOTE - NSFALLUNIVINTERV_ED_ALL_ED
Bed/Stretcher in lowest position, wheels locked, appropriate side rails in place/Call bell, personal items and telephone in reach/Instruct patient to call for assistance before getting out of bed/chair/stretcher/Non-slip footwear applied when patient is off stretcher/[place] to call system/Physically safe environment - no spills, clutter or unnecessary equipment/Purposeful proactive rounding/Room/bathroom lighting operational, light cord in reach Bed/Stretcher in lowest position, wheels locked, appropriate side rails in place/Call bell, personal items and telephone in reach/Instruct patient to call for assistance before getting out of bed/chair/stretcher/Non-slip footwear applied when patient is off stretcher/[place] to call system/Physically safe environment - no spills, clutter or unnecessary equipment/Purposeful proactive rounding/Room/bathroom lighting operational, light cord in reach

## 2023-12-13 NOTE — ED PROVIDER NOTE - CLINICAL SUMMARY MEDICAL DECISION MAKING FREE TEXT BOX
33-year-old male no significant past medical history presents to ED with flulike symptoms for the past 4 days.  Patient reporting chest pain associated with cough.  Cough with sputum production.  Concern for pneumonia versus flu less likely ACS.  Will check labs chest x-ray viral swab reassess

## 2023-12-13 NOTE — ED ADULT NURSE NOTE - OBJECTIVE STATEMENT
Patient presented to the ED complaining of chest pain when coughing. Patient states he has been coughing for 4 days.

## 2023-12-13 NOTE — ED PROVIDER NOTE - OBJECTIVE STATEMENT
33-year-old male denies any significant past medical history presents to ED with complaint of chest pain, headache, myalgias for the past 4 days.  Chest pain only associated with cough.  Patient reporting productive cough.  No nausea no vomiting no rash no abdominal pain no vomiting no diarrhea.

## 2023-12-13 NOTE — ED PROVIDER NOTE - PATIENT PORTAL LINK FT
You can access the FollowMyHealth Patient Portal offered by [facility] by registering at the following website: [email or web address]. By joining [employer]’s FollowMyHealth portal, you will also be able to view your health information using other applications (apps) compatible with our system. You can access the FollowMyHealth Patient Portal offered by [facility] by registering at the following website: [email or web address]. By joining [employer]’s FollowMyHealth portal, you will also be able to view your health information using other applications (apps) compatible with our system.

## 2023-12-26 ENCOUNTER — EMERGENCY (EMERGENCY)
Facility: HOSPITAL | Age: 33
LOS: 1 days | Discharge: HOME | End: 2023-12-26
Attending: STUDENT IN AN ORGANIZED HEALTH CARE EDUCATION/TRAINING PROGRAM
Payer: MEDICAID

## 2023-12-26 VITALS
DIASTOLIC BLOOD PRESSURE: 89 MMHG | HEART RATE: 118 BPM | SYSTOLIC BLOOD PRESSURE: 148 MMHG | WEIGHT: 315 LBS | RESPIRATION RATE: 21 BRPM | OXYGEN SATURATION: 98 % | TEMPERATURE: 101 F | HEIGHT: 75 IN

## 2023-12-26 VITALS
TEMPERATURE: 98 F | HEART RATE: 100 BPM | SYSTOLIC BLOOD PRESSURE: 117 MMHG | OXYGEN SATURATION: 96 % | RESPIRATION RATE: 20 BRPM | DIASTOLIC BLOOD PRESSURE: 75 MMHG

## 2023-12-26 DIAGNOSIS — Z90.49 ACQUIRED ABSENCE OF OTHER SPECIFIED PARTS OF DIGESTIVE TRACT: Chronic | ICD-10-CM

## 2023-12-26 LAB
ALBUMIN SERPL ELPH-MCNC: 4 G/DL — SIGNIFICANT CHANGE UP (ref 3.5–5)
ALBUMIN SERPL ELPH-MCNC: 4 G/DL — SIGNIFICANT CHANGE UP (ref 3.5–5)
ALP SERPL-CCNC: 38 U/L — LOW (ref 40–120)
ALP SERPL-CCNC: 38 U/L — LOW (ref 40–120)
ALT FLD-CCNC: 37 U/L DA — SIGNIFICANT CHANGE UP (ref 10–60)
ALT FLD-CCNC: 37 U/L DA — SIGNIFICANT CHANGE UP (ref 10–60)
ANION GAP SERPL CALC-SCNC: 6 MMOL/L — SIGNIFICANT CHANGE UP (ref 5–17)
ANION GAP SERPL CALC-SCNC: 6 MMOL/L — SIGNIFICANT CHANGE UP (ref 5–17)
APPEARANCE UR: CLEAR — SIGNIFICANT CHANGE UP
APPEARANCE UR: CLEAR — SIGNIFICANT CHANGE UP
APTT BLD: 35.1 SEC — SIGNIFICANT CHANGE UP (ref 24.5–35.6)
APTT BLD: 35.1 SEC — SIGNIFICANT CHANGE UP (ref 24.5–35.6)
AST SERPL-CCNC: 28 U/L — SIGNIFICANT CHANGE UP (ref 10–40)
AST SERPL-CCNC: 28 U/L — SIGNIFICANT CHANGE UP (ref 10–40)
BASOPHILS # BLD AUTO: 0.03 K/UL — SIGNIFICANT CHANGE UP (ref 0–0.2)
BASOPHILS # BLD AUTO: 0.03 K/UL — SIGNIFICANT CHANGE UP (ref 0–0.2)
BASOPHILS NFR BLD AUTO: 0.5 % — SIGNIFICANT CHANGE UP (ref 0–2)
BASOPHILS NFR BLD AUTO: 0.5 % — SIGNIFICANT CHANGE UP (ref 0–2)
BILIRUB SERPL-MCNC: 0.4 MG/DL — SIGNIFICANT CHANGE UP (ref 0.2–1.2)
BILIRUB SERPL-MCNC: 0.4 MG/DL — SIGNIFICANT CHANGE UP (ref 0.2–1.2)
BILIRUB UR-MCNC: NEGATIVE — SIGNIFICANT CHANGE UP
BILIRUB UR-MCNC: NEGATIVE — SIGNIFICANT CHANGE UP
BUN SERPL-MCNC: 13 MG/DL — SIGNIFICANT CHANGE UP (ref 7–18)
BUN SERPL-MCNC: 13 MG/DL — SIGNIFICANT CHANGE UP (ref 7–18)
CALCIUM SERPL-MCNC: 8.3 MG/DL — LOW (ref 8.4–10.5)
CALCIUM SERPL-MCNC: 8.3 MG/DL — LOW (ref 8.4–10.5)
CHLORIDE SERPL-SCNC: 103 MMOL/L — SIGNIFICANT CHANGE UP (ref 96–108)
CHLORIDE SERPL-SCNC: 103 MMOL/L — SIGNIFICANT CHANGE UP (ref 96–108)
CO2 SERPL-SCNC: 28 MMOL/L — SIGNIFICANT CHANGE UP (ref 22–31)
CO2 SERPL-SCNC: 28 MMOL/L — SIGNIFICANT CHANGE UP (ref 22–31)
COLOR SPEC: YELLOW — SIGNIFICANT CHANGE UP
COLOR SPEC: YELLOW — SIGNIFICANT CHANGE UP
CREAT SERPL-MCNC: 1.57 MG/DL — HIGH (ref 0.5–1.3)
CREAT SERPL-MCNC: 1.57 MG/DL — HIGH (ref 0.5–1.3)
DIFF PNL FLD: NEGATIVE — SIGNIFICANT CHANGE UP
DIFF PNL FLD: NEGATIVE — SIGNIFICANT CHANGE UP
EGFR: 59 ML/MIN/1.73M2 — LOW
EGFR: 59 ML/MIN/1.73M2 — LOW
EOSINOPHIL # BLD AUTO: 0.02 K/UL — SIGNIFICANT CHANGE UP (ref 0–0.5)
EOSINOPHIL # BLD AUTO: 0.02 K/UL — SIGNIFICANT CHANGE UP (ref 0–0.5)
EOSINOPHIL NFR BLD AUTO: 0.4 % — SIGNIFICANT CHANGE UP (ref 0–6)
EOSINOPHIL NFR BLD AUTO: 0.4 % — SIGNIFICANT CHANGE UP (ref 0–6)
FLUAV H1 2009 PAND RNA SPEC QL NAA+PROBE: DETECTED
FLUAV H1 2009 PAND RNA SPEC QL NAA+PROBE: DETECTED
GLUCOSE SERPL-MCNC: 100 MG/DL — HIGH (ref 70–99)
GLUCOSE SERPL-MCNC: 100 MG/DL — HIGH (ref 70–99)
GLUCOSE UR QL: NEGATIVE MG/DL — SIGNIFICANT CHANGE UP
GLUCOSE UR QL: NEGATIVE MG/DL — SIGNIFICANT CHANGE UP
HCT VFR BLD CALC: 43.3 % — SIGNIFICANT CHANGE UP (ref 39–50)
HCT VFR BLD CALC: 43.3 % — SIGNIFICANT CHANGE UP (ref 39–50)
HGB BLD-MCNC: 13.8 G/DL — SIGNIFICANT CHANGE UP (ref 13–17)
HGB BLD-MCNC: 13.8 G/DL — SIGNIFICANT CHANGE UP (ref 13–17)
IMM GRANULOCYTES NFR BLD AUTO: 0.4 % — SIGNIFICANT CHANGE UP (ref 0–0.9)
IMM GRANULOCYTES NFR BLD AUTO: 0.4 % — SIGNIFICANT CHANGE UP (ref 0–0.9)
INR BLD: 1.14 RATIO — SIGNIFICANT CHANGE UP (ref 0.85–1.18)
INR BLD: 1.14 RATIO — SIGNIFICANT CHANGE UP (ref 0.85–1.18)
KETONES UR-MCNC: ABNORMAL MG/DL
KETONES UR-MCNC: ABNORMAL MG/DL
LACTATE SERPL-SCNC: 1.5 MMOL/L — SIGNIFICANT CHANGE UP (ref 0.7–2)
LACTATE SERPL-SCNC: 1.5 MMOL/L — SIGNIFICANT CHANGE UP (ref 0.7–2)
LEUKOCYTE ESTERASE UR-ACNC: NEGATIVE — SIGNIFICANT CHANGE UP
LEUKOCYTE ESTERASE UR-ACNC: NEGATIVE — SIGNIFICANT CHANGE UP
LYMPHOCYTES # BLD AUTO: 0.81 K/UL — LOW (ref 1–3.3)
LYMPHOCYTES # BLD AUTO: 0.81 K/UL — LOW (ref 1–3.3)
LYMPHOCYTES # BLD AUTO: 14.3 % — SIGNIFICANT CHANGE UP (ref 13–44)
LYMPHOCYTES # BLD AUTO: 14.3 % — SIGNIFICANT CHANGE UP (ref 13–44)
MCHC RBC-ENTMCNC: 27.5 PG — SIGNIFICANT CHANGE UP (ref 27–34)
MCHC RBC-ENTMCNC: 27.5 PG — SIGNIFICANT CHANGE UP (ref 27–34)
MCHC RBC-ENTMCNC: 31.9 GM/DL — LOW (ref 32–36)
MCHC RBC-ENTMCNC: 31.9 GM/DL — LOW (ref 32–36)
MCV RBC AUTO: 86.4 FL — SIGNIFICANT CHANGE UP (ref 80–100)
MCV RBC AUTO: 86.4 FL — SIGNIFICANT CHANGE UP (ref 80–100)
MONOCYTES # BLD AUTO: 0.92 K/UL — HIGH (ref 0–0.9)
MONOCYTES # BLD AUTO: 0.92 K/UL — HIGH (ref 0–0.9)
MONOCYTES NFR BLD AUTO: 16.3 % — HIGH (ref 2–14)
MONOCYTES NFR BLD AUTO: 16.3 % — HIGH (ref 2–14)
NEUTROPHILS # BLD AUTO: 3.85 K/UL — SIGNIFICANT CHANGE UP (ref 1.8–7.4)
NEUTROPHILS # BLD AUTO: 3.85 K/UL — SIGNIFICANT CHANGE UP (ref 1.8–7.4)
NEUTROPHILS NFR BLD AUTO: 68.1 % — SIGNIFICANT CHANGE UP (ref 43–77)
NEUTROPHILS NFR BLD AUTO: 68.1 % — SIGNIFICANT CHANGE UP (ref 43–77)
NITRITE UR-MCNC: NEGATIVE — SIGNIFICANT CHANGE UP
NITRITE UR-MCNC: NEGATIVE — SIGNIFICANT CHANGE UP
NRBC # BLD: 0 /100 WBCS — SIGNIFICANT CHANGE UP (ref 0–0)
NRBC # BLD: 0 /100 WBCS — SIGNIFICANT CHANGE UP (ref 0–0)
PH UR: 5.5 — SIGNIFICANT CHANGE UP (ref 5–8)
PH UR: 5.5 — SIGNIFICANT CHANGE UP (ref 5–8)
PLATELET # BLD AUTO: 307 K/UL — SIGNIFICANT CHANGE UP (ref 150–400)
PLATELET # BLD AUTO: 307 K/UL — SIGNIFICANT CHANGE UP (ref 150–400)
POTASSIUM SERPL-MCNC: 3.6 MMOL/L — SIGNIFICANT CHANGE UP (ref 3.5–5.3)
POTASSIUM SERPL-MCNC: 3.6 MMOL/L — SIGNIFICANT CHANGE UP (ref 3.5–5.3)
POTASSIUM SERPL-SCNC: 3.6 MMOL/L — SIGNIFICANT CHANGE UP (ref 3.5–5.3)
POTASSIUM SERPL-SCNC: 3.6 MMOL/L — SIGNIFICANT CHANGE UP (ref 3.5–5.3)
PROT SERPL-MCNC: 7.8 G/DL — SIGNIFICANT CHANGE UP (ref 6–8.3)
PROT SERPL-MCNC: 7.8 G/DL — SIGNIFICANT CHANGE UP (ref 6–8.3)
PROT UR-MCNC: NEGATIVE MG/DL — SIGNIFICANT CHANGE UP
PROT UR-MCNC: NEGATIVE MG/DL — SIGNIFICANT CHANGE UP
PROTHROM AB SERPL-ACNC: 13 SEC — SIGNIFICANT CHANGE UP (ref 9.5–13)
PROTHROM AB SERPL-ACNC: 13 SEC — SIGNIFICANT CHANGE UP (ref 9.5–13)
RAPID RVP RESULT: DETECTED
RAPID RVP RESULT: DETECTED
RBC # BLD: 5.01 M/UL — SIGNIFICANT CHANGE UP (ref 4.2–5.8)
RBC # BLD: 5.01 M/UL — SIGNIFICANT CHANGE UP (ref 4.2–5.8)
RBC # FLD: 13.2 % — SIGNIFICANT CHANGE UP (ref 10.3–14.5)
RBC # FLD: 13.2 % — SIGNIFICANT CHANGE UP (ref 10.3–14.5)
SARS-COV-2 RNA SPEC QL NAA+PROBE: SIGNIFICANT CHANGE UP
SARS-COV-2 RNA SPEC QL NAA+PROBE: SIGNIFICANT CHANGE UP
SODIUM SERPL-SCNC: 137 MMOL/L — SIGNIFICANT CHANGE UP (ref 135–145)
SODIUM SERPL-SCNC: 137 MMOL/L — SIGNIFICANT CHANGE UP (ref 135–145)
SP GR SPEC: 1.02 — SIGNIFICANT CHANGE UP (ref 1–1.03)
SP GR SPEC: 1.02 — SIGNIFICANT CHANGE UP (ref 1–1.03)
TROPONIN I, HIGH SENSITIVITY RESULT: 5.8 NG/L — SIGNIFICANT CHANGE UP
TROPONIN I, HIGH SENSITIVITY RESULT: 5.8 NG/L — SIGNIFICANT CHANGE UP
UROBILINOGEN FLD QL: 1 MG/DL — SIGNIFICANT CHANGE UP (ref 0.2–1)
UROBILINOGEN FLD QL: 1 MG/DL — SIGNIFICANT CHANGE UP (ref 0.2–1)
WBC # BLD: 5.65 K/UL — SIGNIFICANT CHANGE UP (ref 3.8–10.5)
WBC # BLD: 5.65 K/UL — SIGNIFICANT CHANGE UP (ref 3.8–10.5)
WBC # FLD AUTO: 5.65 K/UL — SIGNIFICANT CHANGE UP (ref 3.8–10.5)
WBC # FLD AUTO: 5.65 K/UL — SIGNIFICANT CHANGE UP (ref 3.8–10.5)

## 2023-12-26 PROCEDURE — 0225U NFCT DS DNA&RNA 21 SARSCOV2: CPT

## 2023-12-26 PROCEDURE — 80053 COMPREHEN METABOLIC PANEL: CPT

## 2023-12-26 PROCEDURE — 71045 X-RAY EXAM CHEST 1 VIEW: CPT

## 2023-12-26 PROCEDURE — 85610 PROTHROMBIN TIME: CPT

## 2023-12-26 PROCEDURE — 83605 ASSAY OF LACTIC ACID: CPT

## 2023-12-26 PROCEDURE — 81003 URINALYSIS AUTO W/O SCOPE: CPT

## 2023-12-26 PROCEDURE — 96374 THER/PROPH/DIAG INJ IV PUSH: CPT

## 2023-12-26 PROCEDURE — 99284 EMERGENCY DEPT VISIT MOD MDM: CPT

## 2023-12-26 PROCEDURE — 71045 X-RAY EXAM CHEST 1 VIEW: CPT | Mod: 26

## 2023-12-26 PROCEDURE — 85730 THROMBOPLASTIN TIME PARTIAL: CPT

## 2023-12-26 PROCEDURE — 99285 EMERGENCY DEPT VISIT HI MDM: CPT | Mod: 25

## 2023-12-26 PROCEDURE — 84484 ASSAY OF TROPONIN QUANT: CPT

## 2023-12-26 PROCEDURE — 36415 COLL VENOUS BLD VENIPUNCTURE: CPT

## 2023-12-26 PROCEDURE — 93005 ELECTROCARDIOGRAM TRACING: CPT

## 2023-12-26 PROCEDURE — 85025 COMPLETE CBC W/AUTO DIFF WBC: CPT

## 2023-12-26 PROCEDURE — 87086 URINE CULTURE/COLONY COUNT: CPT

## 2023-12-26 PROCEDURE — 87040 BLOOD CULTURE FOR BACTERIA: CPT

## 2023-12-26 RX ORDER — SODIUM CHLORIDE 9 MG/ML
4800 INJECTION INTRAMUSCULAR; INTRAVENOUS; SUBCUTANEOUS ONCE
Refills: 0 | Status: DISCONTINUED | OUTPATIENT
Start: 2023-12-26 | End: 2023-12-26

## 2023-12-26 RX ORDER — SODIUM CHLORIDE 9 MG/ML
2000 INJECTION INTRAMUSCULAR; INTRAVENOUS; SUBCUTANEOUS ONCE
Refills: 0 | Status: COMPLETED | OUTPATIENT
Start: 2023-12-26 | End: 2023-12-26

## 2023-12-26 RX ORDER — IBUPROFEN 200 MG
600 TABLET ORAL ONCE
Refills: 0 | Status: COMPLETED | OUTPATIENT
Start: 2023-12-26 | End: 2023-12-26

## 2023-12-26 RX ORDER — METOCLOPRAMIDE HCL 10 MG
10 TABLET ORAL ONCE
Refills: 0 | Status: COMPLETED | OUTPATIENT
Start: 2023-12-26 | End: 2023-12-26

## 2023-12-26 RX ORDER — ACETAMINOPHEN 500 MG
650 TABLET ORAL ONCE
Refills: 0 | Status: COMPLETED | OUTPATIENT
Start: 2023-12-26 | End: 2023-12-26

## 2023-12-26 RX ADMIN — Medication 650 MILLIGRAM(S): at 18:47

## 2023-12-26 RX ADMIN — Medication 600 MILLIGRAM(S): at 18:47

## 2023-12-26 RX ADMIN — Medication 104 MILLIGRAM(S): at 21:26

## 2023-12-26 RX ADMIN — SODIUM CHLORIDE 2000 MILLILITER(S): 9 INJECTION INTRAMUSCULAR; INTRAVENOUS; SUBCUTANEOUS at 18:48

## 2023-12-26 RX ADMIN — Medication 650 MILLIGRAM(S): at 19:17

## 2023-12-26 RX ADMIN — Medication 600 MILLIGRAM(S): at 19:17

## 2023-12-26 NOTE — ED PROVIDER NOTE - PATIENT PORTAL LINK FT
You can access the FollowMyHealth Patient Portal offered by [facility] by registering at the following website: [email or web address]. By joining [employer]’s FollowMyHealth portal, you will also be able to view your health information using other applications (apps) compatible with our system. You can access the FollowMyHealth Patient Portal offered by [facility] by registering at the following website: [email or web address]. By joining [employer]’s FollowMyHealth portal, you will also be able to view your health information using other applications (apps) compatible with our system.

## 2023-12-26 NOTE — ED PROVIDER NOTE - OBJECTIVE STATEMENT
33 y.o presenting for 1 day of cough, fever, and bodyache. endorse noting headache when coughing. denies n, v, focal weakness, numbness 33 y.o presenting for 1 day of cough, fever, and bodyache. endorse noting headache when coughing. denies n, v, focal weakness, numbness. endorse that he recover from the flu but started feeling symptoms gain yesterday after visiting family member in a hospital;

## 2023-12-26 NOTE — ED ADULT NURSE NOTE - NSFALLUNIVINTERV_ED_ALL_ED
Bed/Stretcher in lowest position, wheels locked, appropriate side rails in place/Call bell, personal items and telephone in reach/Instruct patient to call for assistance before getting out of bed/chair/stretcher/Non-slip footwear applied when patient is off stretcher/[place] to call system/Physically safe environment - no spills, clutter or unnecessary equipment/Purposeful proactive rounding/Room/bathroom lighting operational, light cord in reach Bed/Stretcher in lowest position, wheels locked, appropriate side rails in place/Call bell, personal items and telephone in reach/Instruct patient to call for assistance before getting out of bed/chair/stretcher/Non-slip footwear applied when patient is off stretcher/[place] to call system/Physically safe environment - no spills, clutter or unnecessary equipment/Purposeful proactive rounding/Room/bathroom lighting operational, light cord in reach

## 2023-12-26 NOTE — ED PROVIDER NOTE - CLINICAL SUMMARY MEDICAL DECISION MAKING FREE TEXT BOX
Dodie presenting for headache. fever., bodyache. clinically viral. no meningismus. lab wnl. flu +. patient endorses feeling improved with med. given med, return precaution and instructed ot fu pmd

## 2023-12-27 LAB
CULTURE RESULTS: NO GROWTH — SIGNIFICANT CHANGE UP
CULTURE RESULTS: NO GROWTH — SIGNIFICANT CHANGE UP
SPECIMEN SOURCE: SIGNIFICANT CHANGE UP
SPECIMEN SOURCE: SIGNIFICANT CHANGE UP

## 2023-12-31 LAB
CULTURE RESULTS: SIGNIFICANT CHANGE UP
SPECIMEN SOURCE: SIGNIFICANT CHANGE UP

## 2024-02-26 NOTE — ED PROVIDER NOTE - EKG ADDITIONAL QUESTION - PERFORMED INDEPENDENT VISUALIZATION
Bed in lowest position, wheels locked, appropriate side rails in place/Call bell, personal items and telephone in reach/Instruct patient to call for assistance before getting out of bed or chair/Non-slip footwear when patient is out of bed/[place] to call system/Physically safe environment - no spills, clutter or unnecessary equipment/Purposeful Proactive Rounding/Room/bathroom lighting operational, light cord in reach Yes

## 2024-04-06 NOTE — ED PROVIDER NOTE - PROGRESS NOTE DETAILS
--- patient signedout to me by Dr. Scott. Awaiting CT A/P results.  CT shows Cholecystectomy. Unremarkable postsurgical appearance.  Discussed above with Dr. Barnett-surgical house officer who recommends no further intervention and recommends outpatient followup.  Discussed above with patient. On reeval patient well-appearing, stable for discharge. Saul RODRIGUEZ

## 2025-02-02 ENCOUNTER — EMERGENCY (EMERGENCY)
Facility: HOSPITAL | Age: 35
LOS: 1 days | Discharge: HOME | End: 2025-02-02
Payer: MEDICAID

## 2025-02-02 VITALS
OXYGEN SATURATION: 97 % | RESPIRATION RATE: 18 BRPM | TEMPERATURE: 98 F | WEIGHT: 315 LBS | SYSTOLIC BLOOD PRESSURE: 160 MMHG | HEART RATE: 77 BPM | HEIGHT: 75 IN | DIASTOLIC BLOOD PRESSURE: 80 MMHG

## 2025-02-02 DIAGNOSIS — Z90.49 ACQUIRED ABSENCE OF OTHER SPECIFIED PARTS OF DIGESTIVE TRACT: Chronic | ICD-10-CM

## 2025-02-02 PROCEDURE — 99284 EMERGENCY DEPT VISIT MOD MDM: CPT

## 2025-02-02 PROCEDURE — 73630 X-RAY EXAM OF FOOT: CPT | Mod: 26,LT

## 2025-02-02 PROCEDURE — 99283 EMERGENCY DEPT VISIT LOW MDM: CPT | Mod: 25

## 2025-02-02 PROCEDURE — 73630 X-RAY EXAM OF FOOT: CPT

## 2025-02-02 NOTE — ED PROVIDER NOTE - PHYSICAL EXAMINATION
Left ankle range of motion.  All toes full range of motion.  No edema, ecchymosis or erythema.  Mild tenderness to the first, second and third MTPJ.  No tenderness to the phalanges.  DP/PT pulses intact 2+ bilaterally.  Cap refill less than 2 seconds.

## 2025-02-02 NOTE — ED ADULT TRIAGE NOTE - STATUS:
[Home] : at home, [unfilled] , at the time of the visit. [Medical Office: ([facility])___] : at the medical office located in  [Verbal consent obtained from patient] : the patient, [unfilled] [FreeTextEntry1] : Ms. Cabral is an 81 year old female  who was spoken to via video call for a follow up visit. Her chief complaint is weak legs.\par \par -she is accompanied by her daughter today on video call\par -she notes fatigue and weakness\par -she notes SOB\par -she notes pulse ox reading of 90%\par -she notes several recent falls limiting her mobility\par -She notes Her bowels are regular \par -she notes appetite is diminished\par -she notes some anxiety over health\par -she notes sleep could be better\par -she notes mid day naps\par -she notes gasping for air when talking\par -she notes intermittent palpitations\par -she notes compliance with nebulizer\par \par -she denies any chest pain, chest pressure, diarrhea, constipation, dysphagia, dizziness, sour taste in the mouth, leg swelling, leg pain, itchy eyes, itchy ears, heartburn, reflux, myalgias or arthralgias.\par  Applied

## 2025-02-02 NOTE — ED PROVIDER NOTE - CLINICAL SUMMARY MEDICAL DECISION MAKING FREE TEXT BOX
34-year-old male, presents for evaluation status post left foot injury yesterday.  Well-appearing, neurovascular intact.  No open wounds.  X-ray negative for fracture.  Diagnosis: Sprain.  Ortho shoe applied for comfort.  Should follow-up with PCP in 5 to 7 days.  Blood pressure slightly elevated.  Discussed about following up with PCP regarding this as well.

## 2025-02-02 NOTE — ED ADULT TRIAGE NOTE - CHIEF COMPLAINT QUOTE
Pt stated his dog peed on the floor yesterday and he slipped in the pee hitting his left foot against the wall hurting the top and first 3 toes on his left foot. c/o pain and swelling to toes and top of left foot.

## 2025-02-02 NOTE — ED PROVIDER NOTE - PATIENT PORTAL LINK FT
You can access the FollowMyHealth Patient Portal offered by [facility] by registering at the following website: [email or web address]. By joining [employer]’s FollowMyHealth portal, you will also be able to view your health information using other applications (apps) compatible with our system.

## 2025-02-02 NOTE — ED ADULT NURSE NOTE - CHIEF COMPLAINT QUOTE
Problem: Patient Care Overview  Goal: Plan of Care/Patient Progress Review  Outcome: Improving  Pt alert and awake all shift. Orientation unknown. Pt sitting in chair throughout day and ambulating in hallways with PT and writer a few times. Ambulating with stand by assist/x1 assist well. Meds crushed and given in pudding. Zofran given before meds to help with nausea. Sitter discontinued at 0930 and tolerating well. Yelling out at times for attention possibly due to boredom in her room. Yelling out appropriately for bathroom. Complaints of pain and tylenol given x2. Pt to be evaluated by group home staff tomorrow at 1100. Continue to monitor.        Pt stated his dog peed on the floor yesterday and he slipped in the pee hitting his left foot against the wall hurting the top and first 3 toes on his left foot. c/o pain and swelling to toes and top of left foot.

## 2025-02-02 NOTE — ED PROVIDER NOTE - OBJECTIVE STATEMENT
34-year-old male, no significant past medical history, presents for evaluation of left foot injury yesterday.  Accidentally slipped and hit the wall bending his toes backwards.  and reports pain to the first, second and third MTPJ.  Pain worse with movement.  No relieving factors.  Nuys any numbness, tingling, focal weakness or any other injuries or complaints.

## 2025-02-02 NOTE — ED PROVIDER NOTE - NSFOLLOWUPINSTRUCTIONS_ED_ALL_ED_FT
Follow-up with the primary care doctor in 5-7 days.  For pain you can take over the counter Ibuprofen 600 mg orally every 6 hours as needed for pain. Take medication with food.     If you experience any new or worsening symptoms or if you are concerned you can always come back to the emergency for a re-evaluation.  Some results may not be available at the time of your discharge from the hospital. You can download the FOLLOW MY HEALTH juno and have access to these results.  **Official radiology report by the radiologist will be available within 24 hours. If there is a discrepancy you will contacted.

## 2025-06-13 NOTE — ED ADULT NURSE NOTE - CHIEF COMPLAINT QUOTE
DM FOLLOW UP  E11.9 - Type 2 diabetes    Lj Valenzuela is a 78 y.o. male here today at the request of Referring Provider: Kunal Rosales MD for my opinion regarding diabetes management.  My final recommendations will be communicated back to the requesting provider by way of shared medical record.     Patient here today to follow up re: T2DM.  Started Mounjaro at last OV.  Pt currently taking Mounjaro 5mg weekly, Farxiga 10mg daily.    Patient is approved for V[employer]     Subjective   Past Medical History:  He has a past medical history of AICD (automatic cardioverter/defibrillator) present (05/29/2016), Angina pectoris (09/06/2023), Basal ganglia hemorrhage (Multi) (05/29/2016), Cancer (Multi), Chronic atrial fibrillation (Multi) (09/06/2023), Chronic systolic heart failure (09/06/2023), CKD (chronic kidney disease) (08/30/2021), Coronary artery disease (05/29/2016), Diabetes mellitus (Multi) (07/18/2018), Essential hypertension (03/24/2023), History of coronary artery stent placement (09/06/2023), Hypertension, Left ventricular thrombus (06/01/2016), Long term (current) use of anticoagulants (10/19/2023), Mixed hyperlipidemia (03/20/2020), Paroxysmal atrial fibrillation (Multi) (10/19/2023), 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    Social History:  He reports that he quit smoking about 54 years ago. His smoking use included cigarettes. He has been exposed to tobacco smoke. He has never used smokeless tobacco. He reports that he does not currently use alcohol. He reports that he does not use drugs.    Allergies:  Patient has no known allergies.    CURRENT PHARMACOTHERAPY   Current Outpatient Medications   Medication Instructions    atorvastatin (LIPITOR) 80 mg, oral, Nightly    blood sugar diagnostic (Truetrack Test) strip 2 times daily    calcitriol (Rocaltrol) 0.25 mcg capsule 1 capsule    carvedilol (COREG) 25 mg, oral, 2 times daily    cholecalciferol (Vitamin  D3) 50 MCG (2000 UT) tablet     dapagliflozin propanediol (FARXIGA) 10 mg, oral, Every other day    Jantoven 6 mg, oral, Every evening    lamoTRIgine (LAMICTAL) 200 mg, Every morning    Mounjaro 5 mg, subcutaneous, Every 7 days    sacubitriL-valsartan (Entresto) 49-51 mg tablet 1 tablet, oral, 2 times daily    torsemide (DEMADEX) 20 mg, oral, 2 times daily     Pt denies SE/intolerances  Reviewed all medications by prescribing practitioner (such as prescriptions, OTCs, herbal therapies, supplements) and documented in the medical record.     Reviewed need for secondary prevention: Statins, ACE-I/ARB, Aspirin    Primary/Secondary Prevention   - Statin? Yes  - ACE-I/ARB? No  - Aspirin? No      Glucose Monitoring  Patient is checking glucose intermittently.  Recent FBS:    Lifestyle:  Current diet: pt reports eating smaller meals due to GLP-1  Current exercise: 30 minutes 3-4 days per week and walking outside    Last Labs/Vitals/Meds  POC HEMOGLOBIN A1c (%)   Date Value   06/13/2025 7.5 (A)     HEMOGLOBIN A1c (% of total Hgb)   Date Value   04/02/2025 8.8 (H)     Hemoglobin A1C (%)   Date Value   07/23/2024 8.0 (H)   01/30/2024 7.4 (H)   10/17/2023 8.1 (H)   03/20/2023 7.2 (H)   09/07/2022 7.1 (H)   03/11/2022 7.1 (H)   08/24/2021 7.1 (H)   03/01/2021 7.0 (H)   09/01/2020 6.8 (H)     GLUCOSE (mg/dL)   Date Value   04/02/2025 177 (H)     CREATININE (mg/dL)   Date Value   04/02/2025 2.42 (H)     EGFR (mL/min/1.73m2)   Date Value   04/02/2025 26 (L)       BP Readings from Last 3 Encounters:   04/08/25 112/68   04/07/25 108/60   01/14/25 130/64        Wt Readings from Last 6 Encounters:   06/13/25 83.7 kg (184 lb 8 oz)   04/08/25 87.5 kg (193 lb)   04/07/25 84.8 kg (187 lb)   01/14/25 86.2 kg (190 lb)   01/06/25 87.5 kg (193 lb)   12/04/24 86.2 kg (190 lb)     BMI Readings from Last 6 Encounters:   06/13/25 28.06 kg/m²   04/08/25 29.35 kg/m²   04/07/25 28.43 kg/m²   01/14/25 27.26 kg/m²   01/06/25 27.69 kg/m²   12/04/24  27.26 kg/m²       Assessment:  Patients diabetes is improved with most recent A1c of 7.5% (Goal < 7%).  Was 8.8% 2 months ago  Compliance at present is estimated to be good  Discussed continue current dose, weekly GLP-1.  Pt is agreeable   Follow up in 2 months.        Plan:  1.  Continue all medications at current dosages  2.  Limit carbohydrates to 60 grams per meal and 20 grams per snack   3.  Follow up in 2 months with clinical pharmacist      Data reviewed and evaluated by HUEY Carrero RP, [facility]  Treatment and plan changes discussed with Kunal Rosales MD   patient reports headaches chest pain dizziness x today

## (undated) DEVICE — SPONGE ENDO PEANUT 5MM

## (undated) DEVICE — NDL INSUFFLATION SURGINEEDLE 120MM

## (undated) DEVICE — SUT POLYSORB 0 30" GU-46

## (undated) DEVICE — DRAPE C ARM UNIVERSAL

## (undated) DEVICE — BLADE SURGICAL #15 CARBON

## (undated) DEVICE — FOR-ESU VALLEYLAB T7E14830DX: Type: DURABLE MEDICAL EQUIPMENT

## (undated) DEVICE — TUBING STRYKER PNEUMOSURE HEATED RTP

## (undated) DEVICE — DRSG MASTISOL

## (undated) DEVICE — GLV 7 PROTEXIS

## (undated) DEVICE — SUT SURGIPRO 1 30" GS-21

## (undated) DEVICE — DRAIN JACKSON PRATT 10MM FLAT 3/4 NO TROCAR

## (undated) DEVICE — SYR LUER LOK 10CC

## (undated) DEVICE — SOL IRR POUR NS 0.9% 1500ML

## (undated) DEVICE — GLV 7 ESTEEM BLUE

## (undated) DEVICE — DRSG GAUZE PETROLEUM 3X9"

## (undated) DEVICE — PRECISION CUT SCISSOR MICROLINE MINI ENDOCUT DISP

## (undated) DEVICE — SUT POLYSORB 4-0 27" P-12 UNDYED

## (undated) DEVICE — TROCAR COVIDIEN VERSAONE BLADED SMOOTH 5MM

## (undated) DEVICE — ELCTR FOOT CONTROL L WIRE LAPAROSCOPIC

## (undated) DEVICE — BLANKET WARMER UPPER ADULT

## (undated) DEVICE — ELCTR GROUNDING PAD ADULT COVIDIEN

## (undated) DEVICE — DRAPE HALF SHEET 40X57"

## (undated) DEVICE — DRAIN RESERVOIR FOR JACKSON PRATT 100CC CARDINAL

## (undated) DEVICE — D HELP - CLEARVIEW CLEARIFY SYSTEM

## (undated) DEVICE — WRAP COMPRESSION CALF MED

## (undated) DEVICE — TUBING STRYKEFLOW II SUCTION / IRRIGATOR

## (undated) DEVICE — NDL HYPO SAFE 25G X 1.5"

## (undated) DEVICE — DRSG BANDAID 0.75X3"

## (undated) DEVICE — PACK GENERAL LAPAROSCOPY

## (undated) DEVICE — DRAPE LIGHT HANDLE COVER BLUE

## (undated) DEVICE — TROCAR COVIDIEN VERSAONE BLADED 11MM STD